# Patient Record
Sex: FEMALE | Employment: PART TIME | URBAN - METROPOLITAN AREA
[De-identification: names, ages, dates, MRNs, and addresses within clinical notes are randomized per-mention and may not be internally consistent; named-entity substitution may affect disease eponyms.]

---

## 2019-01-01 ENCOUNTER — APPOINTMENT (OUTPATIENT)
Dept: CT IMAGING | Age: 59
DRG: 720 | End: 2019-01-01
Attending: INTERNAL MEDICINE
Payer: MEDICAID

## 2019-01-01 ENCOUNTER — APPOINTMENT (OUTPATIENT)
Dept: GENERAL RADIOLOGY | Age: 59
DRG: 720 | End: 2019-01-01
Attending: EMERGENCY MEDICINE
Payer: MEDICAID

## 2019-01-01 ENCOUNTER — APPOINTMENT (OUTPATIENT)
Dept: CT IMAGING | Age: 59
DRG: 720 | End: 2019-01-01
Attending: PHYSICIAN ASSISTANT
Payer: MEDICAID

## 2019-01-01 ENCOUNTER — APPOINTMENT (OUTPATIENT)
Dept: CT IMAGING | Age: 59
DRG: 720 | End: 2019-01-01
Attending: EMERGENCY MEDICINE
Payer: MEDICAID

## 2019-01-01 ENCOUNTER — APPOINTMENT (OUTPATIENT)
Dept: NON INVASIVE DIAGNOSTICS | Age: 59
DRG: 720 | End: 2019-01-01
Attending: HOSPITALIST
Payer: MEDICAID

## 2019-01-01 ENCOUNTER — APPOINTMENT (OUTPATIENT)
Dept: GENERAL RADIOLOGY | Age: 59
DRG: 720 | End: 2019-01-01
Attending: INTERNAL MEDICINE
Payer: MEDICAID

## 2019-01-01 ENCOUNTER — APPOINTMENT (OUTPATIENT)
Dept: GENERAL RADIOLOGY | Age: 59
DRG: 720 | End: 2019-01-01
Attending: STUDENT IN AN ORGANIZED HEALTH CARE EDUCATION/TRAINING PROGRAM
Payer: MEDICAID

## 2019-01-01 ENCOUNTER — HOSPITAL ENCOUNTER (INPATIENT)
Age: 59
LOS: 3 days | DRG: 720 | End: 2019-12-10
Attending: EMERGENCY MEDICINE | Admitting: HOSPITALIST
Payer: MEDICAID

## 2019-01-01 ENCOUNTER — APPOINTMENT (OUTPATIENT)
Dept: GENERAL RADIOLOGY | Age: 59
DRG: 720 | End: 2019-01-01
Attending: HOSPITALIST
Payer: MEDICAID

## 2019-01-01 VITALS
HEIGHT: 66 IN | HEART RATE: 39 BPM | WEIGHT: 195.55 LBS | BODY MASS INDEX: 31.43 KG/M2 | OXYGEN SATURATION: 98 % | DIASTOLIC BLOOD PRESSURE: 53 MMHG | TEMPERATURE: 99.1 F | SYSTOLIC BLOOD PRESSURE: 72 MMHG

## 2019-01-01 DIAGNOSIS — R79.89 ELEVATED LACTIC ACID LEVEL: ICD-10-CM

## 2019-01-01 DIAGNOSIS — R77.8 ELEVATED TROPONIN: ICD-10-CM

## 2019-01-01 DIAGNOSIS — R41.82 ALTERED MENTAL STATUS, UNSPECIFIED ALTERED MENTAL STATUS TYPE: Primary | ICD-10-CM

## 2019-01-01 LAB
ABO + RH BLD: NORMAL
ALBUMIN SERPL-MCNC: 2.3 G/DL (ref 3.4–5)
ALBUMIN SERPL-MCNC: 2.4 G/DL (ref 3.4–5)
ALBUMIN/GLOB SERPL: 0.4 {RATIO} (ref 0.8–1.7)
ALBUMIN/GLOB SERPL: 0.6 {RATIO} (ref 0.8–1.7)
ALP SERPL-CCNC: 170 U/L (ref 45–117)
ALP SERPL-CCNC: 182 U/L (ref 45–117)
ALT SERPL-CCNC: 34 U/L (ref 13–56)
ALT SERPL-CCNC: 454 U/L (ref 13–56)
AMMONIA PLAS-SCNC: 29 UMOL/L (ref 11–32)
AMMONIA PLAS-SCNC: 64 UMOL/L (ref 11–32)
AMPHET UR QL SCN: NEGATIVE
ANION GAP SERPL CALC-SCNC: 10 MMOL/L (ref 3–18)
ANION GAP SERPL CALC-SCNC: 10 MMOL/L (ref 3–18)
ANION GAP SERPL CALC-SCNC: 11 MMOL/L (ref 3–18)
ANION GAP SERPL CALC-SCNC: 12 MMOL/L (ref 3–18)
ANION GAP SERPL CALC-SCNC: 12 MMOL/L (ref 3–18)
APPEARANCE UR: ABNORMAL
APPEARANCE UR: ABNORMAL
APTT PPP: 31.3 SEC (ref 23–36.4)
APTT PPP: 32.1 SEC (ref 23–36.4)
APTT PPP: 33.1 SEC (ref 23–36.4)
APTT PPP: 40.4 SEC (ref 23–36.4)
APTT PPP: >180 SEC (ref 23–36.4)
ARTERIAL PATENCY WRIST A: ABNORMAL
ARTERIAL PATENCY WRIST A: YES
AST SERPL-CCNC: 32 U/L (ref 10–38)
AST SERPL-CCNC: 735 U/L (ref 10–38)
ATRIAL RATE: 108 BPM
ATRIAL RATE: 150 BPM
ATRIAL RATE: 86 BPM
ATRIAL RATE: 94 BPM
BACTERIA SPEC CULT: NORMAL
BACTERIA URNS QL MICRO: ABNORMAL /HPF
BARBITURATES UR QL SCN: NEGATIVE
BASE DEFICIT BLD-SCNC: 10 MMOL/L
BASE DEFICIT BLD-SCNC: 11 MMOL/L
BASE DEFICIT BLD-SCNC: 14 MMOL/L
BASE DEFICIT BLD-SCNC: 5 MMOL/L
BASE DEFICIT BLD-SCNC: 7 MMOL/L
BASE DEFICIT BLD-SCNC: 7 MMOL/L
BASE DEFICIT BLD-SCNC: 8 MMOL/L
BASE DEFICIT BLD-SCNC: 8 MMOL/L
BASE DEFICIT BLD-SCNC: 9 MMOL/L
BASOPHILS # BLD: 0 K/UL (ref 0–0.06)
BASOPHILS NFR BLD: 0 % (ref 0–3)
BDY SITE: ABNORMAL
BENZODIAZ UR QL: NEGATIVE
BILIRUB DIRECT SERPL-MCNC: 0.7 MG/DL (ref 0–0.2)
BILIRUB SERPL-MCNC: 0.6 MG/DL (ref 0.2–1)
BILIRUB SERPL-MCNC: 0.9 MG/DL (ref 0.2–1)
BILIRUB UR QL: ABNORMAL
BILIRUB UR QL: ABNORMAL
BLOOD GROUP ANTIBODIES SERPL: NORMAL
BODY TEMPERATURE: 98.6
BRONCH. LAVAGE DIFF.,BR: NORMAL
BUN SERPL-MCNC: 31 MG/DL (ref 7–18)
BUN SERPL-MCNC: 32 MG/DL (ref 7–18)
BUN SERPL-MCNC: 32 MG/DL (ref 7–18)
BUN SERPL-MCNC: 34 MG/DL (ref 7–18)
BUN SERPL-MCNC: 49 MG/DL (ref 7–18)
BUN/CREAT SERPL: 19 (ref 12–20)
BUN/CREAT SERPL: 24 (ref 12–20)
BUN/CREAT SERPL: 25 (ref 12–20)
BUN/CREAT SERPL: 26 (ref 12–20)
BUN/CREAT SERPL: 27 (ref 12–20)
CA-I SERPL-SCNC: 1.08 MMOL/L (ref 1.12–1.32)
CALCIUM SERPL-MCNC: 7.8 MG/DL (ref 8.5–10.1)
CALCIUM SERPL-MCNC: 7.9 MG/DL (ref 8.5–10.1)
CALCIUM SERPL-MCNC: 8.1 MG/DL (ref 8.5–10.1)
CALCIUM SERPL-MCNC: 8.2 MG/DL (ref 8.5–10.1)
CALCIUM SERPL-MCNC: 8.7 MG/DL (ref 8.5–10.1)
CALCULATED P AXIS, ECG09: 46 DEGREES
CALCULATED P AXIS, ECG09: 57 DEGREES
CALCULATED R AXIS, ECG10: -31 DEGREES
CALCULATED R AXIS, ECG10: -36 DEGREES
CALCULATED R AXIS, ECG10: -39 DEGREES
CALCULATED R AXIS, ECG10: -44 DEGREES
CALCULATED T AXIS, ECG11: 13 DEGREES
CALCULATED T AXIS, ECG11: 22 DEGREES
CALCULATED T AXIS, ECG11: 33 DEGREES
CALCULATED T AXIS, ECG11: 50 DEGREES
CANNABINOIDS UR QL SCN: NEGATIVE
CHLORIDE SERPL-SCNC: 108 MMOL/L (ref 100–111)
CHLORIDE SERPL-SCNC: 111 MMOL/L (ref 100–111)
CHLORIDE SERPL-SCNC: 111 MMOL/L (ref 100–111)
CHLORIDE SERPL-SCNC: 112 MMOL/L (ref 100–111)
CHLORIDE SERPL-SCNC: 99 MMOL/L (ref 100–111)
CK MB CFR SERPL CALC: 3.4 % (ref 0–4)
CK MB CFR SERPL CALC: 4.9 % (ref 0–4)
CK MB CFR SERPL CALC: 5.8 % (ref 0–4)
CK MB CFR SERPL CALC: 7.2 % (ref 0–4)
CK MB CFR SERPL CALC: 7.8 % (ref 0–4)
CK MB CFR SERPL CALC: 9.2 % (ref 0–4)
CK MB SERPL-MCNC: 16.5 NG/ML (ref 5–25)
CK MB SERPL-MCNC: 16.8 NG/ML (ref 5–25)
CK MB SERPL-MCNC: 2.6 NG/ML (ref 5–25)
CK MB SERPL-MCNC: 3.4 NG/ML (ref 5–25)
CK MB SERPL-MCNC: 3.5 NG/ML (ref 5–25)
CK MB SERPL-MCNC: 4.5 NG/ML (ref 5–25)
CK SERPL-CCNC: 131 U/L (ref 26–192)
CK SERPL-CCNC: 179 U/L (ref 26–192)
CK SERPL-CCNC: 216 U/L (ref 26–192)
CK SERPL-CCNC: 36 U/L (ref 26–192)
CK SERPL-CCNC: 59 U/L (ref 26–192)
CK SERPL-CCNC: 72 U/L (ref 26–192)
CO2 SERPL-SCNC: 17 MMOL/L (ref 21–32)
CO2 SERPL-SCNC: 19 MMOL/L (ref 21–32)
CO2 SERPL-SCNC: 21 MMOL/L (ref 21–32)
COCAINE UR QL SCN: NEGATIVE
COLOR UR: ABNORMAL
COLOR UR: ABNORMAL
CREAT SERPL-MCNC: 1.17 MG/DL (ref 0.6–1.3)
CREAT SERPL-MCNC: 1.21 MG/DL (ref 0.6–1.3)
CREAT SERPL-MCNC: 1.25 MG/DL (ref 0.6–1.3)
CREAT SERPL-MCNC: 1.8 MG/DL (ref 0.6–1.3)
CREAT SERPL-MCNC: 2.05 MG/DL (ref 0.6–1.3)
D DIMER PPP FEU-MCNC: >20 UG/ML(FEU)
DIAGNOSIS, 93000: NORMAL
DIFFERENTIAL METHOD BLD: ABNORMAL
ECHO LV EDV A2C: 39.3 ML
ECHO LV EDV A4C: 47.4 ML
ECHO LV EDV BP: 49.1 ML (ref 56–104)
ECHO LV EDV INDEX A4C: 24 ML/M2
ECHO LV EDV INDEX BP: 24.8 ML/M2
ECHO LV EDV NDEX A2C: 19.9 ML/M2
ECHO LV EDV TEICHHOLZ: 0.17 ML
ECHO LV EJECTION FRACTION A2C: 49 %
ECHO LV EJECTION FRACTION A4C: 41 %
ECHO LV EJECTION FRACTION BIPLANE: 48.1 % (ref 55–100)
ECHO LV ESV A2C: 20.2 ML
ECHO LV ESV A4C: 28.1 ML
ECHO LV ESV BP: 25.5 ML (ref 19–49)
ECHO LV ESV INDEX A2C: 10.2 ML/M2
ECHO LV ESV INDEX A4C: 14.2 ML/M2
ECHO LV ESV INDEX BP: 12.9 ML/M2
ECHO LV ESV TEICHHOLZ: 0.12 ML
ECHO LV INTERNAL DIMENSION DIASTOLIC: 2.74 CM (ref 3.9–5.3)
ECHO LV INTERNAL DIMENSION SYSTOLIC: 2.38 CM
ECHO LV IVSD: 1.17 CM (ref 0.6–0.9)
ECHO LV MASS 2D: 104.2 G (ref 67–162)
ECHO LV MASS INDEX 2D: 52.7 G/M2 (ref 43–95)
ECHO LV POSTERIOR WALL DIASTOLIC: 1.2 CM (ref 0.6–0.9)
EOSINOPHIL # BLD: 0 K/UL (ref 0–0.4)
EOSINOPHIL NFR BLD: 0 % (ref 0–5)
EOSINOPHIL NFR BRONCH MANUAL: 0 %
EPITH CASTS URNS QL MICRO: ABNORMAL /LPF (ref 0–5)
ERYTHROCYTE [DISTWIDTH] IN BLOOD BY AUTOMATED COUNT: 13.8 % (ref 11.6–14.5)
ERYTHROCYTE [DISTWIDTH] IN BLOOD BY AUTOMATED COUNT: 13.9 % (ref 11.6–14.5)
ERYTHROCYTE [DISTWIDTH] IN BLOOD BY AUTOMATED COUNT: 14.4 % (ref 11.6–14.5)
ERYTHROCYTE [DISTWIDTH] IN BLOOD BY AUTOMATED COUNT: 14.5 % (ref 11.6–14.5)
ERYTHROCYTE [DISTWIDTH] IN BLOOD BY AUTOMATED COUNT: 15.1 % (ref 11.6–14.5)
EST. AVERAGE GLUCOSE BLD GHB EST-MCNC: 169 MG/DL
ETHANOL SERPL-MCNC: <3 MG/DL (ref 0–3)
FIBRINOGEN PPP-MCNC: 188 MG/DL (ref 210–451)
FLUAV AG NPH QL IA: NEGATIVE
FLUBV AG NOSE QL IA: NEGATIVE
GAS FLOW.O2 O2 DELIVERY SYS: ABNORMAL L/MIN
GAS FLOW.O2 SETTING OXYMISER: 10 BPM
GAS FLOW.O2 SETTING OXYMISER: 18 BPM
GAS FLOW.O2 SETTING OXYMISER: 20 BPM
GAS FLOW.O2 SETTING OXYMISER: 20 BPM
GAS FLOW.O2 SETTING OXYMISER: 40 L/M
GAS FLOW.O2 SETTING OXYMISER: 55 L/M
GAS FLOW.O2 SETTING OXYMISER: 55 L/M
GLOBULIN SER CALC-MCNC: 3.9 G/DL (ref 2–4)
GLOBULIN SER CALC-MCNC: 5.6 G/DL (ref 2–4)
GLUCOSE BLD STRIP.AUTO-MCNC: 116 MG/DL (ref 70–110)
GLUCOSE BLD STRIP.AUTO-MCNC: 128 MG/DL (ref 70–110)
GLUCOSE BLD STRIP.AUTO-MCNC: 133 MG/DL (ref 70–110)
GLUCOSE BLD STRIP.AUTO-MCNC: 144 MG/DL (ref 70–110)
GLUCOSE BLD STRIP.AUTO-MCNC: 147 MG/DL (ref 70–110)
GLUCOSE BLD STRIP.AUTO-MCNC: 148 MG/DL (ref 70–110)
GLUCOSE BLD STRIP.AUTO-MCNC: 158 MG/DL (ref 70–110)
GLUCOSE BLD STRIP.AUTO-MCNC: 165 MG/DL (ref 70–110)
GLUCOSE BLD STRIP.AUTO-MCNC: 166 MG/DL (ref 70–110)
GLUCOSE BLD STRIP.AUTO-MCNC: 178 MG/DL (ref 70–110)
GLUCOSE SERPL-MCNC: 151 MG/DL (ref 74–99)
GLUCOSE SERPL-MCNC: 184 MG/DL (ref 74–99)
GLUCOSE SERPL-MCNC: 189 MG/DL (ref 74–99)
GLUCOSE SERPL-MCNC: 237 MG/DL (ref 74–99)
GLUCOSE SERPL-MCNC: 265 MG/DL (ref 74–99)
GLUCOSE UR STRIP.AUTO-MCNC: NEGATIVE MG/DL
GLUCOSE UR STRIP.AUTO-MCNC: NEGATIVE MG/DL
HBA1C MFR BLD: 7.5 % (ref 4.2–5.6)
HCO3 BLD-SCNC: 11.9 MMOL/L (ref 22–26)
HCO3 BLD-SCNC: 14.2 MMOL/L (ref 22–26)
HCO3 BLD-SCNC: 16.1 MMOL/L (ref 22–26)
HCO3 BLD-SCNC: 16.7 MMOL/L (ref 22–26)
HCO3 BLD-SCNC: 17.6 MMOL/L (ref 22–26)
HCO3 BLD-SCNC: 18 MMOL/L (ref 22–26)
HCO3 BLD-SCNC: 18.1 MMOL/L (ref 22–26)
HCO3 BLD-SCNC: 18.1 MMOL/L (ref 22–26)
HCO3 BLD-SCNC: 22 MMOL/L (ref 22–26)
HCT VFR BLD AUTO: 32.3 % (ref 35–45)
HCT VFR BLD AUTO: 32.9 % (ref 35–45)
HCT VFR BLD AUTO: 33.5 % (ref 35–45)
HCT VFR BLD AUTO: 34.5 % (ref 35–45)
HCT VFR BLD AUTO: 36.6 % (ref 35–45)
HDSCOM,HDSCOM: NORMAL
HGB BLD-MCNC: 10 G/DL (ref 12–16)
HGB BLD-MCNC: 10.9 G/DL (ref 12–16)
HGB BLD-MCNC: 11 G/DL (ref 12–16)
HGB BLD-MCNC: 11.3 G/DL (ref 12–16)
HGB BLD-MCNC: 12.4 G/DL (ref 12–16)
HGB UR QL STRIP: NEGATIVE
HGB UR QL STRIP: NEGATIVE
INR PPP: 4.3 (ref 0.8–1.2)
INR PPP: 4.4 (ref 0.8–1.2)
INR PPP: 4.4 (ref 0.8–1.2)
INR PPP: 7.3 (ref 0.8–1.2)
INR PPP: >9.2 (ref 0.8–1.2)
INSPIRATION.DURATION SETTING TIME VENT: 0.9 SEC
KETONES UR QL STRIP.AUTO: NEGATIVE MG/DL
KETONES UR QL STRIP.AUTO: NEGATIVE MG/DL
LACTATE BLD-SCNC: 2.94 MMOL/L (ref 0.4–2)
LACTATE BLD-SCNC: 4.71 MMOL/L (ref 0.4–2)
LACTATE BLD-SCNC: 4.72 MMOL/L (ref 0.4–2)
LACTATE BLD-SCNC: 5.99 MMOL/L (ref 0.4–2)
LACTATE SERPL-SCNC: 3 MMOL/L (ref 0.4–2)
LACTATE SERPL-SCNC: 3.1 MMOL/L (ref 0.4–2)
LACTATE SERPL-SCNC: 5.4 MMOL/L (ref 0.4–2)
LACTATE SERPL-SCNC: 6.1 MMOL/L (ref 0.4–2)
LACTATE SERPL-SCNC: 6.6 MMOL/L (ref 0.4–2)
LDH SERPL L TO P-CCNC: 1209 U/L (ref 81–234)
LDH SERPL L TO P-CCNC: 624 U/L (ref 81–234)
LEUKOCYTE ESTERASE UR QL STRIP.AUTO: NEGATIVE
LEUKOCYTE ESTERASE UR QL STRIP.AUTO: NEGATIVE
LVFS 2D: 13.33 %
LVSV (MOD BI): 11.63 ML
LVSV (MOD SINGLE 4C): 9.49 ML
LVSV (MOD SINGLE): 9.43 ML
LVSV (TEICH): 4.14 ML
LYMPHOCYTES # BLD: 0.8 K/UL (ref 0.8–3.5)
LYMPHOCYTES # BLD: 0.8 K/UL (ref 0.8–3.5)
LYMPHOCYTES # BLD: 0.9 K/UL (ref 0.8–3.5)
LYMPHOCYTES # BLD: 0.9 K/UL (ref 0.8–3.5)
LYMPHOCYTES # BLD: 1.4 K/UL (ref 0.8–3.5)
LYMPHOCYTES NFR BLD: 4 % (ref 20–51)
LYMPHOCYTES NFR BLD: 5 % (ref 20–51)
LYMPHOCYTES NFR BLD: 7 % (ref 20–51)
LYMPHOCYTES NFR BRONCH MANUAL: 9 %
MACROPHAGES NFR BRONCH MANUAL: 55 %
MAGNESIUM SERPL-MCNC: 2.5 MG/DL (ref 1.6–2.6)
MAGNESIUM SERPL-MCNC: 2.6 MG/DL (ref 1.6–2.6)
MAGNESIUM SERPL-MCNC: 2.9 MG/DL (ref 1.6–2.6)
MCH RBC QN AUTO: 28.1 PG (ref 24–34)
MCH RBC QN AUTO: 28.8 PG (ref 24–34)
MCH RBC QN AUTO: 29 PG (ref 24–34)
MCH RBC QN AUTO: 29.2 PG (ref 24–34)
MCH RBC QN AUTO: 29.5 PG (ref 24–34)
MCHC RBC AUTO-ENTMCNC: 31 G/DL (ref 31–37)
MCHC RBC AUTO-ENTMCNC: 32.5 G/DL (ref 31–37)
MCHC RBC AUTO-ENTMCNC: 32.8 G/DL (ref 31–37)
MCHC RBC AUTO-ENTMCNC: 33.4 G/DL (ref 31–37)
MCHC RBC AUTO-ENTMCNC: 33.9 G/DL (ref 31–37)
MCV RBC AUTO: 87.1 FL (ref 74–97)
MCV RBC AUTO: 87.3 FL (ref 74–97)
MCV RBC AUTO: 88.6 FL (ref 74–97)
MCV RBC AUTO: 88.7 FL (ref 74–97)
MCV RBC AUTO: 90.7 FL (ref 74–97)
METAMYELOCYTES NFR BLD MANUAL: 1 %
METHADONE UR QL: NEGATIVE
MONOCYTES # BLD: 0.4 K/UL (ref 0–1)
MONOCYTES # BLD: 0.7 K/UL (ref 0–1)
MONOCYTES # BLD: 1 K/UL (ref 0–1)
MONOCYTES # BLD: 1 K/UL (ref 0–1)
MONOCYTES # BLD: 1.7 K/UL (ref 0–1)
MONOCYTES NFR BLD: 2 % (ref 2–9)
MONOCYTES NFR BLD: 3 % (ref 2–9)
MONOCYTES NFR BLD: 5 % (ref 2–9)
MONOCYTES NFR BLD: 5 % (ref 2–9)
MONOCYTES NFR BLD: 9 % (ref 2–9)
NEUTROPHILS NFR BRONCH MANUAL: 36 %
NEUTS BAND NFR BLD MANUAL: 2 % (ref 0–5)
NEUTS BAND NFR BLD MANUAL: 4 % (ref 0–5)
NEUTS BAND NFR BLD MANUAL: 5 % (ref 0–5)
NEUTS SEG # BLD: 16.1 K/UL (ref 1.8–8)
NEUTS SEG # BLD: 17.5 K/UL (ref 1.8–8)
NEUTS SEG # BLD: 17.8 K/UL (ref 1.8–8)
NEUTS SEG # BLD: 19.1 K/UL (ref 1.8–8)
NEUTS SEG # BLD: 20.7 K/UL (ref 1.8–8)
NEUTS SEG NFR BLD: 86 % (ref 42–75)
NEUTS SEG NFR BLD: 86 % (ref 42–75)
NEUTS SEG NFR BLD: 88 % (ref 42–75)
NEUTS SEG NFR BLD: 89 % (ref 42–75)
NEUTS SEG NFR BLD: 91 % (ref 42–75)
NITRITE UR QL STRIP.AUTO: NEGATIVE
NITRITE UR QL STRIP.AUTO: NEGATIVE
NRBC BLD-RTO: 8 PER 100 WBC
O2/TOTAL GAS SETTING VFR VENT: 0.5 %
O2/TOTAL GAS SETTING VFR VENT: 100 %
O2/TOTAL GAS SETTING VFR VENT: 100 %
O2/TOTAL GAS SETTING VFR VENT: 50 %
O2/TOTAL GAS SETTING VFR VENT: 50 %
O2/TOTAL GAS SETTING VFR VENT: 60 %
OPIATES UR QL: NEGATIVE
P-R INTERVAL, ECG05: 146 MS
P-R INTERVAL, ECG05: 154 MS
PCO2 BLD: 24.4 MMHG (ref 35–45)
PCO2 BLD: 26.3 MMHG (ref 35–45)
PCO2 BLD: 26.7 MMHG (ref 35–45)
PCO2 BLD: 27.4 MMHG (ref 35–45)
PCO2 BLD: 28.4 MMHG (ref 35–45)
PCO2 BLD: 31.5 MMHG (ref 35–45)
PCO2 BLD: 31.8 MMHG (ref 35–45)
PCO2 BLD: 44.3 MMHG (ref 35–45)
PCO2 BLD: 71 MMHG (ref 35–45)
PCP UR QL: NEGATIVE
PEEP RESPIRATORY: 5 CMH2O
PH BLD: 7.1 [PH] (ref 7.35–7.45)
PH BLD: 7.22 [PH] (ref 7.35–7.45)
PH BLD: 7.26 [PH] (ref 7.35–7.45)
PH BLD: 7.33 [PH] (ref 7.35–7.45)
PH BLD: 7.36 [PH] (ref 7.35–7.45)
PH BLD: 7.37 [PH] (ref 7.35–7.45)
PH BLD: 7.38 [PH] (ref 7.35–7.45)
PH BLD: 7.41 [PH] (ref 7.35–7.45)
PH BLD: 7.43 [PH] (ref 7.35–7.45)
PH UR STRIP: 5 [PH] (ref 5–8)
PH UR STRIP: 5 [PH] (ref 5–8)
PHOSPHATE SERPL-MCNC: 3.9 MG/DL (ref 2.5–4.9)
PIP ISTAT,IPIP: 14
PIP ISTAT,IPIP: 18
PIP ISTAT,IPIP: 31
PIP ISTAT,IPIP: 34
PLATELET # BLD AUTO: 129 K/UL (ref 135–420)
PLATELET # BLD AUTO: 237 K/UL (ref 135–420)
PLATELET # BLD AUTO: 280 K/UL (ref 135–420)
PLATELET # BLD AUTO: 281 K/UL (ref 135–420)
PLATELET # BLD AUTO: 326 K/UL (ref 135–420)
PLATELET COMMENTS,PCOM: ABNORMAL
PMV BLD AUTO: 10 FL (ref 9.2–11.8)
PMV BLD AUTO: 10 FL (ref 9.2–11.8)
PMV BLD AUTO: 10.6 FL (ref 9.2–11.8)
PMV BLD AUTO: 9.7 FL (ref 9.2–11.8)
PMV BLD AUTO: 9.8 FL (ref 9.2–11.8)
PO2 BLD: 106 MMHG (ref 80–100)
PO2 BLD: 110 MMHG (ref 80–100)
PO2 BLD: 125 MMHG (ref 80–100)
PO2 BLD: 144 MMHG (ref 80–100)
PO2 BLD: 271 MMHG (ref 80–100)
PO2 BLD: 34 MMHG (ref 80–100)
PO2 BLD: 73 MMHG (ref 80–100)
PO2 BLD: 74 MMHG (ref 80–100)
PO2 BLD: 86 MMHG (ref 80–100)
POTASSIUM SERPL-SCNC: 4.3 MMOL/L (ref 3.5–5.5)
POTASSIUM SERPL-SCNC: 4.4 MMOL/L (ref 3.5–5.5)
POTASSIUM SERPL-SCNC: 4.6 MMOL/L (ref 3.5–5.5)
POTASSIUM SERPL-SCNC: 4.7 MMOL/L (ref 3.5–5.5)
POTASSIUM SERPL-SCNC: 4.8 MMOL/L (ref 3.5–5.5)
PROT SERPL-MCNC: 6.3 G/DL (ref 6.4–8.2)
PROT SERPL-MCNC: 7.9 G/DL (ref 6.4–8.2)
PROT UR STRIP-MCNC: 30 MG/DL
PROT UR STRIP-MCNC: NEGATIVE MG/DL
PROTHROMBIN TIME: 40.7 SEC (ref 11.5–15.2)
PROTHROMBIN TIME: 41.8 SEC (ref 11.5–15.2)
PROTHROMBIN TIME: 41.9 SEC (ref 11.5–15.2)
PROTHROMBIN TIME: 62.4 SEC (ref 11.5–15.2)
PROTHROMBIN TIME: >75 SEC (ref 11.5–15.2)
Q-T INTERVAL, ECG07: 254 MS
Q-T INTERVAL, ECG07: 260 MS
Q-T INTERVAL, ECG07: 346 MS
Q-T INTERVAL, ECG07: 368 MS
QRS DURATION, ECG06: 68 MS
QRS DURATION, ECG06: 70 MS
QRS DURATION, ECG06: 84 MS
QRS DURATION, ECG06: 88 MS
QTC CALCULATION (BEZET), ECG08: 366 MS
QTC CALCULATION (BEZET), ECG08: 396 MS
QTC CALCULATION (BEZET), ECG08: 440 MS
QTC CALCULATION (BEZET), ECG08: 463 MS
RBC # BLD AUTO: 3.56 M/UL (ref 4.2–5.3)
RBC # BLD AUTO: 3.77 M/UL (ref 4.2–5.3)
RBC # BLD AUTO: 3.78 M/UL (ref 4.2–5.3)
RBC # BLD AUTO: 3.89 M/UL (ref 4.2–5.3)
RBC # BLD AUTO: 4.2 M/UL (ref 4.2–5.3)
RBC #/AREA URNS HPF: ABNORMAL /HPF (ref 0–5)
RBC MORPH BLD: ABNORMAL
SAO2 % BLD: 100 % (ref 92–97)
SAO2 % BLD: 63 % (ref 92–97)
SAO2 % BLD: 94 % (ref 92–97)
SAO2 % BLD: 95 % (ref 92–97)
SAO2 % BLD: 95 % (ref 92–97)
SAO2 % BLD: 97 % (ref 92–97)
SAO2 % BLD: 98 % (ref 92–97)
SAO2 % BLD: 98 % (ref 92–97)
SAO2 % BLD: 99 % (ref 92–97)
SERVICE CMNT-IMP: ABNORMAL
SERVICE CMNT-IMP: NORMAL
SODIUM SERPL-SCNC: 132 MMOL/L (ref 136–145)
SODIUM SERPL-SCNC: 137 MMOL/L (ref 136–145)
SODIUM SERPL-SCNC: 140 MMOL/L (ref 136–145)
SODIUM SERPL-SCNC: 141 MMOL/L (ref 136–145)
SODIUM SERPL-SCNC: 141 MMOL/L (ref 136–145)
SP GR UR REFRACTOMETRY: 1.02 (ref 1–1.03)
SP GR UR REFRACTOMETRY: 1.03 (ref 1–1.03)
SPECIMEN EXP DATE BLD: NORMAL
SPECIMEN TYPE: ABNORMAL
SPONTANEOUS TIMED, IST: YES
TOTAL RESP. RATE, ITRR: 10
TOTAL RESP. RATE, ITRR: 19
TOTAL RESP. RATE, ITRR: 28
TOTAL RESP. RATE, ITRR: 30
TOTAL RESP. RATE, ITRR: 30
TOTAL RESP. RATE, ITRR: 31
TOTAL RESP. RATE, ITRR: 36
TOTAL RESP. RATE, ITRR: 36
TOTAL RESP. RATE, ITRR: 40
TROPONIN I SERPL-MCNC: 0.29 NG/ML (ref 0–0.04)
TROPONIN I SERPL-MCNC: 0.47 NG/ML (ref 0–0.04)
TROPONIN I SERPL-MCNC: 0.56 NG/ML (ref 0–0.04)
TROPONIN I SERPL-MCNC: 0.71 NG/ML (ref 0–0.04)
TROPONIN I SERPL-MCNC: 0.71 NG/ML (ref 0–0.04)
TROPONIN I SERPL-MCNC: 4.6 NG/ML (ref 0–0.04)
TROPONIN I SERPL-MCNC: 7.24 NG/ML (ref 0–0.04)
URATE CRY URNS QL MICRO: ABNORMAL
UROBILINOGEN UR QL STRIP.AUTO: 2 EU/DL (ref 0.2–1)
UROBILINOGEN UR QL STRIP.AUTO: 4 EU/DL (ref 0.2–1)
VENTILATION MODE VENT: ABNORMAL
VENTRICULAR RATE, ECG03: 108 BPM
VENTRICULAR RATE, ECG03: 125 BPM
VENTRICULAR RATE, ECG03: 140 BPM
VENTRICULAR RATE, ECG03: 86 BPM
VOLUME CONTROL PLUS IVLCP: YES
VT SETTING VENT: 400 ML
WBC # BLD AUTO: 18.7 K/UL (ref 4.6–13.2)
WBC # BLD AUTO: 19.6 K/UL (ref 4.6–13.2)
WBC # BLD AUTO: 19.9 K/UL (ref 4.6–13.2)
WBC # BLD AUTO: 20.5 K/UL (ref 4.6–13.2)
WBC # BLD AUTO: 22.3 K/UL (ref 4.6–13.2)
WBC URNS QL MICRO: ABNORMAL /HPF (ref 0–5)

## 2019-01-01 PROCEDURE — 71045 X-RAY EXAM CHEST 1 VIEW: CPT

## 2019-01-01 PROCEDURE — 74011250636 HC RX REV CODE- 250/636: Performed by: NURSE PRACTITIONER

## 2019-01-01 PROCEDURE — 36600 WITHDRAWAL OF ARTERIAL BLOOD: CPT

## 2019-01-01 PROCEDURE — 84484 ASSAY OF TROPONIN QUANT: CPT

## 2019-01-01 PROCEDURE — 5A1935Z RESPIRATORY VENTILATION, LESS THAN 24 CONSECUTIVE HOURS: ICD-10-PCS | Performed by: INTERNAL MEDICINE

## 2019-01-01 PROCEDURE — 74011000258 HC RX REV CODE- 258: Performed by: HOSPITALIST

## 2019-01-01 PROCEDURE — 74011000250 HC RX REV CODE- 250: Performed by: PHYSICIAN ASSISTANT

## 2019-01-01 PROCEDURE — 74011250636 HC RX REV CODE- 250/636: Performed by: EMERGENCY MEDICINE

## 2019-01-01 PROCEDURE — 81001 URINALYSIS AUTO W/SCOPE: CPT

## 2019-01-01 PROCEDURE — 86900 BLOOD TYPING SEROLOGIC ABO: CPT

## 2019-01-01 PROCEDURE — 85025 COMPLETE CBC W/AUTO DIFF WBC: CPT

## 2019-01-01 PROCEDURE — 85379 FIBRIN DEGRADATION QUANT: CPT

## 2019-01-01 PROCEDURE — 80076 HEPATIC FUNCTION PANEL: CPT

## 2019-01-01 PROCEDURE — 83010 ASSAY OF HAPTOGLOBIN QUANT: CPT

## 2019-01-01 PROCEDURE — 82803 BLOOD GASES ANY COMBINATION: CPT

## 2019-01-01 PROCEDURE — 71250 CT THORAX DX C-: CPT

## 2019-01-01 PROCEDURE — 74011000250 HC RX REV CODE- 250: Performed by: INTERNAL MEDICINE

## 2019-01-01 PROCEDURE — 82962 GLUCOSE BLOOD TEST: CPT

## 2019-01-01 PROCEDURE — 80053 COMPREHEN METABOLIC PANEL: CPT

## 2019-01-01 PROCEDURE — 85730 THROMBOPLASTIN TIME PARTIAL: CPT

## 2019-01-01 PROCEDURE — 83605 ASSAY OF LACTIC ACID: CPT

## 2019-01-01 PROCEDURE — 74011250636 HC RX REV CODE- 250/636: Performed by: HOSPITALIST

## 2019-01-01 PROCEDURE — 74011636637 HC RX REV CODE- 636/637: Performed by: HOSPITALIST

## 2019-01-01 PROCEDURE — 36415 COLL VENOUS BLD VENIPUNCTURE: CPT

## 2019-01-01 PROCEDURE — 74011250636 HC RX REV CODE- 250/636: Performed by: PHYSICIAN ASSISTANT

## 2019-01-01 PROCEDURE — 82550 ASSAY OF CK (CPK): CPT

## 2019-01-01 PROCEDURE — 82140 ASSAY OF AMMONIA: CPT

## 2019-01-01 PROCEDURE — 96361 HYDRATE IV INFUSION ADD-ON: CPT

## 2019-01-01 PROCEDURE — 80048 BASIC METABOLIC PNL TOTAL CA: CPT

## 2019-01-01 PROCEDURE — 93005 ELECTROCARDIOGRAM TRACING: CPT

## 2019-01-01 PROCEDURE — 87070 CULTURE OTHR SPECIMN AEROBIC: CPT

## 2019-01-01 PROCEDURE — 76450000000

## 2019-01-01 PROCEDURE — 94003 VENT MGMT INPAT SUBQ DAY: CPT

## 2019-01-01 PROCEDURE — 65610000006 HC RM INTENSIVE CARE

## 2019-01-01 PROCEDURE — 65660000000 HC RM CCU STEPDOWN

## 2019-01-01 PROCEDURE — 83615 LACTATE (LD) (LDH) ENZYME: CPT

## 2019-01-01 PROCEDURE — 94002 VENT MGMT INPAT INIT DAY: CPT

## 2019-01-01 PROCEDURE — 93308 TTE F-UP OR LMTD: CPT

## 2019-01-01 PROCEDURE — 89051 BODY FLUID CELL COUNT: CPT

## 2019-01-01 PROCEDURE — 77030005513 HC CATH URETH FOL11 MDII -B

## 2019-01-01 PROCEDURE — 77010033711 HC HIGH FLOW OXYGEN

## 2019-01-01 PROCEDURE — 81003 URINALYSIS AUTO W/O SCOPE: CPT

## 2019-01-01 PROCEDURE — 84145 PROCALCITONIN (PCT): CPT

## 2019-01-01 PROCEDURE — 94660 CPAP INITIATION&MGMT: CPT

## 2019-01-01 PROCEDURE — 85610 PROTHROMBIN TIME: CPT

## 2019-01-01 PROCEDURE — 85384 FIBRINOGEN ACTIVITY: CPT

## 2019-01-01 PROCEDURE — 96360 HYDRATION IV INFUSION INIT: CPT

## 2019-01-01 PROCEDURE — 74011250636 HC RX REV CODE- 250/636

## 2019-01-01 PROCEDURE — 74011250636 HC RX REV CODE- 250/636: Performed by: INTERNAL MEDICINE

## 2019-01-01 PROCEDURE — 82330 ASSAY OF CALCIUM: CPT

## 2019-01-01 PROCEDURE — 99285 EMERGENCY DEPT VISIT HI MDM: CPT

## 2019-01-01 PROCEDURE — 02HV33Z INSERTION OF INFUSION DEVICE INTO SUPERIOR VENA CAVA, PERCUTANEOUS APPROACH: ICD-10-PCS | Performed by: INTERNAL MEDICINE

## 2019-01-01 PROCEDURE — 87086 URINE CULTURE/COLONY COUNT: CPT

## 2019-01-01 PROCEDURE — 74011000250 HC RX REV CODE- 250: Performed by: STUDENT IN AN ORGANIZED HEALTH CARE EDUCATION/TRAINING PROGRAM

## 2019-01-01 PROCEDURE — 0BH17EZ INSERTION OF ENDOTRACHEAL AIRWAY INTO TRACHEA, VIA NATURAL OR ARTIFICIAL OPENING: ICD-10-PCS | Performed by: INTERNAL MEDICINE

## 2019-01-01 PROCEDURE — 77030018836 HC SOL IRR NACL ICUM -A

## 2019-01-01 PROCEDURE — 84100 ASSAY OF PHOSPHORUS: CPT

## 2019-01-01 PROCEDURE — P9045 ALBUMIN (HUMAN), 5%, 250 ML: HCPCS | Performed by: PHYSICIAN ASSISTANT

## 2019-01-01 PROCEDURE — 70450 CT HEAD/BRAIN W/O DYE: CPT

## 2019-01-01 PROCEDURE — 0B9J8ZX DRAINAGE OF LEFT LOWER LUNG LOBE, VIA NATURAL OR ARTIFICIAL OPENING ENDOSCOPIC, DIAGNOSTIC: ICD-10-PCS | Performed by: INTERNAL MEDICINE

## 2019-01-01 PROCEDURE — 74018 RADEX ABDOMEN 1 VIEW: CPT

## 2019-01-01 PROCEDURE — 74011000258 HC RX REV CODE- 258: Performed by: EMERGENCY MEDICINE

## 2019-01-01 PROCEDURE — 31500 INSERT EMERGENCY AIRWAY: CPT

## 2019-01-01 PROCEDURE — 74011250636 HC RX REV CODE- 250/636: Performed by: STUDENT IN AN ORGANIZED HEALTH CARE EDUCATION/TRAINING PROGRAM

## 2019-01-01 PROCEDURE — 87804 INFLUENZA ASSAY W/OPTIC: CPT

## 2019-01-01 PROCEDURE — 80307 DRUG TEST PRSMV CHEM ANLYZR: CPT

## 2019-01-01 PROCEDURE — 83036 HEMOGLOBIN GLYCOSYLATED A1C: CPT

## 2019-01-01 PROCEDURE — 83735 ASSAY OF MAGNESIUM: CPT

## 2019-01-01 PROCEDURE — 77030008771 HC TU NG SALEM SUMP -A

## 2019-01-01 PROCEDURE — 94762 N-INVAS EAR/PLS OXIMTRY CONT: CPT

## 2019-01-01 PROCEDURE — 5A09357 ASSISTANCE WITH RESPIRATORY VENTILATION, LESS THAN 24 CONSECUTIVE HOURS, CONTINUOUS POSITIVE AIRWAY PRESSURE: ICD-10-PCS | Performed by: EMERGENCY MEDICINE

## 2019-01-01 PROCEDURE — 85611 PROTHROMBIN TEST: CPT

## 2019-01-01 PROCEDURE — 92950 HEART/LUNG RESUSCITATION CPR: CPT

## 2019-01-01 PROCEDURE — 87040 BLOOD CULTURE FOR BACTERIA: CPT

## 2019-01-01 PROCEDURE — 74011000250 HC RX REV CODE- 250

## 2019-01-01 PROCEDURE — 65660000004 HC RM CVT STEPDOWN

## 2019-01-01 RX ORDER — EPINEPHRINE 1 MG/ML
INJECTION, SOLUTION, CONCENTRATE INTRAVENOUS
Status: DISPENSED
Start: 2019-01-01 | End: 2019-01-01

## 2019-01-01 RX ORDER — SODIUM CHLORIDE FOR INHALATION 3 %
4 VIAL, NEBULIZER (ML) INHALATION
Status: DISCONTINUED | OUTPATIENT
Start: 2019-01-01 | End: 2019-01-01

## 2019-01-01 RX ORDER — AMIODARONE HCL/D5W 450 MG/250
1 PLASTIC BAG, INJECTION (ML) INTRAVENOUS
Status: DISCONTINUED | OUTPATIENT
Start: 2019-01-01 | End: 2019-01-01

## 2019-01-01 RX ORDER — INSULIN LISPRO 100 [IU]/ML
INJECTION, SOLUTION INTRAVENOUS; SUBCUTANEOUS EVERY 6 HOURS
Status: DISCONTINUED | OUTPATIENT
Start: 2019-01-01 | End: 2019-01-01

## 2019-01-01 RX ORDER — MAGNESIUM SULFATE 100 %
4 CRYSTALS MISCELLANEOUS AS NEEDED
Status: DISCONTINUED | OUTPATIENT
Start: 2019-01-01 | End: 2019-01-01

## 2019-01-01 RX ORDER — FENTANYL CITRATE 50 UG/ML
50-100 INJECTION, SOLUTION INTRAMUSCULAR; INTRAVENOUS
Status: DISCONTINUED | OUTPATIENT
Start: 2019-01-01 | End: 2019-01-01 | Stop reason: HOSPADM

## 2019-01-01 RX ORDER — KETAMINE HCL 50MG/ML(1)
SYRINGE (ML) INTRAVENOUS
Status: COMPLETED
Start: 2019-01-01 | End: 2019-01-01

## 2019-01-01 RX ORDER — HEPARIN SODIUM 10000 [USP'U]/100ML
18-36 INJECTION, SOLUTION INTRAVENOUS
Status: DISCONTINUED | OUTPATIENT
Start: 2019-01-01 | End: 2019-01-01

## 2019-01-01 RX ORDER — SODIUM CHLORIDE 0.9 % (FLUSH) 0.9 %
5-10 SYRINGE (ML) INJECTION AS NEEDED
Status: DISCONTINUED | OUTPATIENT
Start: 2019-01-01 | End: 2019-01-01

## 2019-01-01 RX ORDER — CHLORHEXIDINE GLUCONATE 1.2 MG/ML
10 RINSE ORAL EVERY 12 HOURS
Status: DISCONTINUED | OUTPATIENT
Start: 2019-01-01 | End: 2019-01-01

## 2019-01-01 RX ORDER — SODIUM CHLORIDE 9 MG/ML
50 INJECTION, SOLUTION INTRAVENOUS CONTINUOUS
Status: DISCONTINUED | OUTPATIENT
Start: 2019-01-01 | End: 2019-01-01

## 2019-01-01 RX ORDER — PHYTONADIONE 10 MG/ML
10 INJECTION, EMULSION INTRAMUSCULAR; INTRAVENOUS; SUBCUTANEOUS ONCE
Status: COMPLETED | OUTPATIENT
Start: 2019-01-01 | End: 2019-01-01

## 2019-01-01 RX ORDER — AMIODARONE HYDROCHLORIDE 200 MG/1
200 TABLET ORAL DAILY
COMMUNITY

## 2019-01-01 RX ORDER — FUROSEMIDE 10 MG/ML
20 INJECTION INTRAMUSCULAR; INTRAVENOUS ONCE
Status: COMPLETED | OUTPATIENT
Start: 2019-01-01 | End: 2019-01-01

## 2019-01-01 RX ORDER — NOREPINEPHRINE BITARTRATE/D5W 8 MG/250ML
PLASTIC BAG, INJECTION (ML) INTRAVENOUS
Status: COMPLETED
Start: 2019-01-01 | End: 2019-01-01

## 2019-01-01 RX ORDER — ONDANSETRON 2 MG/ML
4 INJECTION INTRAMUSCULAR; INTRAVENOUS
Status: DISCONTINUED | OUTPATIENT
Start: 2019-01-01 | End: 2019-01-01

## 2019-01-01 RX ORDER — MONTELUKAST SODIUM 10 MG/1
10 TABLET ORAL
Status: DISCONTINUED | OUTPATIENT
Start: 2019-01-01 | End: 2019-01-01

## 2019-01-01 RX ORDER — BENZONATATE 100 MG/1
CAPSULE ORAL
COMMUNITY
Start: 2019-01-01

## 2019-01-01 RX ORDER — MONTELUKAST SODIUM 10 MG/1
10 TABLET ORAL
COMMUNITY
Start: 2019-01-01

## 2019-01-01 RX ORDER — NOREPINEPHRINE BITARTRATE/D5W 8 MG/250ML
.5-3 PLASTIC BAG, INJECTION (ML) INTRAVENOUS
Status: DISCONTINUED | OUTPATIENT
Start: 2019-01-01 | End: 2019-01-01

## 2019-01-01 RX ORDER — FUROSEMIDE 40 MG/1
40 TABLET ORAL
COMMUNITY
Start: 2019-01-01

## 2019-01-01 RX ORDER — SODIUM CHLORIDE 9 MG/ML
1000 INJECTION, SOLUTION INTRAVENOUS ONCE
Status: COMPLETED | OUTPATIENT
Start: 2019-01-01 | End: 2019-01-01

## 2019-01-01 RX ORDER — VANCOMYCIN HYDROCHLORIDE
1250
Status: DISCONTINUED | OUTPATIENT
Start: 2019-01-01 | End: 2019-01-01

## 2019-01-01 RX ORDER — FLUTICASONE PROPIONATE 50 MCG
1 SPRAY, SUSPENSION (ML) NASAL
COMMUNITY
Start: 2019-01-01

## 2019-01-01 RX ORDER — MIDAZOLAM HYDROCHLORIDE 1 MG/ML
1-2 INJECTION, SOLUTION INTRAMUSCULAR; INTRAVENOUS
Status: DISCONTINUED | OUTPATIENT
Start: 2019-01-01 | End: 2019-01-01 | Stop reason: HOSPADM

## 2019-01-01 RX ORDER — METOPROLOL SUCCINATE 50 MG/1
50 TABLET, EXTENDED RELEASE ORAL
COMMUNITY
Start: 2019-01-01

## 2019-01-01 RX ORDER — SODIUM CHLORIDE 9 MG/ML
25 INJECTION, SOLUTION INTRAVENOUS CONTINUOUS
Status: DISCONTINUED | OUTPATIENT
Start: 2019-01-01 | End: 2019-01-01

## 2019-01-01 RX ORDER — EPINEPHRINE 0.1 MG/ML
INJECTION INTRACARDIAC; INTRAVENOUS
Status: DISPENSED
Start: 2019-01-01 | End: 2019-01-01

## 2019-01-01 RX ORDER — AMIODARONE HYDROCHLORIDE 200 MG/1
200 TABLET ORAL DAILY
Status: DISCONTINUED | OUTPATIENT
Start: 2019-01-01 | End: 2019-01-01

## 2019-01-01 RX ORDER — SODIUM CHLORIDE 9 MG/ML
250 INJECTION, SOLUTION INTRAVENOUS AS NEEDED
Status: DISCONTINUED | OUTPATIENT
Start: 2019-01-01 | End: 2019-01-01

## 2019-01-01 RX ORDER — HYDROCORTISONE SODIUM SUCCINATE 100 MG/2ML
50 INJECTION, POWDER, FOR SOLUTION INTRAMUSCULAR; INTRAVENOUS EVERY 6 HOURS
Status: DISCONTINUED | OUTPATIENT
Start: 2019-01-01 | End: 2019-01-01

## 2019-01-01 RX ORDER — DEXTROSE 50 % IN WATER (D50W) INTRAVENOUS SYRINGE
25-50 AS NEEDED
Status: DISCONTINUED | OUTPATIENT
Start: 2019-01-01 | End: 2019-01-01

## 2019-01-01 RX ORDER — ALBUMIN HUMAN 50 G/1000ML
25 SOLUTION INTRAVENOUS ONCE
Status: COMPLETED | OUTPATIENT
Start: 2019-01-01 | End: 2019-01-01

## 2019-01-01 RX ORDER — KETAMINE HCL 50MG/ML(1)
150 SYRINGE (ML) INTRAVENOUS ONCE
Status: COMPLETED | OUTPATIENT
Start: 2019-01-01 | End: 2019-01-01

## 2019-01-01 RX ORDER — VANCOMYCIN 2 GRAM/500 ML IN 0.9 % SODIUM CHLORIDE INTRAVENOUS
2000 ONCE
Status: COMPLETED | OUTPATIENT
Start: 2019-01-01 | End: 2019-01-01

## 2019-01-01 RX ORDER — PHENYLEPHRINE HCL IN 0.9% NACL 1 MG/10 ML
SYRINGE (ML) INTRAVENOUS
Status: COMPLETED
Start: 2019-01-01 | End: 2019-01-01

## 2019-01-01 RX ADMIN — Medication 10 MCG/MIN: at 15:01

## 2019-01-01 RX ADMIN — AMIODARONE HYDROCHLORIDE 150 MG: 1.5 INJECTION, SOLUTION INTRAVENOUS at 21:58

## 2019-01-01 RX ADMIN — VANCOMYCIN HYDROCHLORIDE 2000 MG: 10 INJECTION, POWDER, LYOPHILIZED, FOR SOLUTION INTRAVENOUS at 03:00

## 2019-01-01 RX ADMIN — HYDROCORTISONE SODIUM SUCCINATE 50 MG: 100 INJECTION, POWDER, FOR SOLUTION INTRAMUSCULAR; INTRAVENOUS at 02:21

## 2019-01-01 RX ADMIN — Medication 150 MG: at 14:00

## 2019-01-01 RX ADMIN — FAMOTIDINE 20 MG: 10 INJECTION, SOLUTION INTRAVENOUS at 21:35

## 2019-01-01 RX ADMIN — INSULIN LISPRO 2 UNITS: 100 INJECTION, SOLUTION INTRAVENOUS; SUBCUTANEOUS at 23:30

## 2019-01-01 RX ADMIN — SODIUM CHLORIDE 1000 ML: 900 INJECTION, SOLUTION INTRAVENOUS at 13:20

## 2019-01-01 RX ADMIN — FUROSEMIDE 20 MG: 10 INJECTION, SOLUTION INTRAMUSCULAR; INTRAVENOUS at 20:07

## 2019-01-01 RX ADMIN — SODIUM CHLORIDE 1000 ML: 900 INJECTION, SOLUTION INTRAVENOUS at 18:14

## 2019-01-01 RX ADMIN — INSULIN LISPRO 2 UNITS: 100 INJECTION, SOLUTION INTRAVENOUS; SUBCUTANEOUS at 00:06

## 2019-01-01 RX ADMIN — SODIUM CHLORIDE 1000 ML: 900 INJECTION, SOLUTION INTRAVENOUS at 15:00

## 2019-01-01 RX ADMIN — PIPERACILLIN AND TAZOBACTAM 3.38 G: 3; .375 INJECTION, POWDER, FOR SOLUTION INTRAVENOUS at 06:36

## 2019-01-01 RX ADMIN — PIPERACILLIN AND TAZOBACTAM 3.38 G: 3; .375 INJECTION, POWDER, FOR SOLUTION INTRAVENOUS at 18:11

## 2019-01-01 RX ADMIN — PIPERACILLIN AND TAZOBACTAM 3.38 G: 3; .375 INJECTION, POWDER, FOR SOLUTION INTRAVENOUS at 18:06

## 2019-01-01 RX ADMIN — PIPERACILLIN AND TAZOBACTAM 3.38 G: 3; .375 INJECTION, POWDER, FOR SOLUTION INTRAVENOUS at 23:29

## 2019-01-01 RX ADMIN — SODIUM CHLORIDE 0.99 ML: 900 INJECTION, SOLUTION INTRAVENOUS at 19:21

## 2019-01-01 RX ADMIN — MIDAZOLAM 2 MG: 1 INJECTION INTRAMUSCULAR; INTRAVENOUS at 08:41

## 2019-01-01 RX ADMIN — CHLORHEXIDINE GLUCONATE 0.12% ORAL RINSE 10 ML: 1.2 LIQUID ORAL at 18:06

## 2019-01-01 RX ADMIN — SODIUM CHLORIDE 50 ML/HR: 900 INJECTION, SOLUTION INTRAVENOUS at 20:20

## 2019-01-01 RX ADMIN — VANCOMYCIN HYDROCHLORIDE 1250 MG: 10 INJECTION, POWDER, LYOPHILIZED, FOR SOLUTION INTRAVENOUS at 20:21

## 2019-01-01 RX ADMIN — HEPARIN SODIUM 18 UNITS/KG/HR: 10000 INJECTION, SOLUTION INTRAVENOUS at 00:13

## 2019-01-01 RX ADMIN — Medication 10 MCG/MIN: at 06:39

## 2019-01-01 RX ADMIN — PIPERACILLIN AND TAZOBACTAM 3.38 G: 3; .375 INJECTION, POWDER, FOR SOLUTION INTRAVENOUS at 14:16

## 2019-01-01 RX ADMIN — CHLORHEXIDINE GLUCONATE 0.12% ORAL RINSE 10 ML: 1.2 LIQUID ORAL at 20:12

## 2019-01-01 RX ADMIN — AMIODARONE HYDROCHLORIDE 0.5 MG/MIN: 1.8 INJECTION, SOLUTION INTRAVENOUS at 04:29

## 2019-01-01 RX ADMIN — HYDROCORTISONE SODIUM SUCCINATE 50 MG: 100 INJECTION, POWDER, FOR SOLUTION INTRAMUSCULAR; INTRAVENOUS at 07:59

## 2019-01-01 RX ADMIN — Medication 10 MCG/MIN: at 18:07

## 2019-01-01 RX ADMIN — SODIUM CHLORIDE 1000 ML: 900 INJECTION, SOLUTION INTRAVENOUS at 17:27

## 2019-01-01 RX ADMIN — AMIODARONE HYDROCHLORIDE 1 MG/MIN: 1.8 INJECTION, SOLUTION INTRAVENOUS at 21:59

## 2019-01-01 RX ADMIN — MIDAZOLAM 2 MG: 1 INJECTION INTRAMUSCULAR; INTRAVENOUS at 09:35

## 2019-01-01 RX ADMIN — PIPERACILLIN AND TAZOBACTAM 3.38 G: 3; .375 INJECTION, POWDER, FOR SOLUTION INTRAVENOUS at 11:49

## 2019-01-01 RX ADMIN — ALBUMIN (HUMAN) 25 G: 12.5 INJECTION, SOLUTION INTRAVENOUS at 23:28

## 2019-01-01 RX ADMIN — FENTANYL CITRATE 100 MCG: 50 INJECTION, SOLUTION INTRAMUSCULAR; INTRAVENOUS at 09:28

## 2019-01-01 RX ADMIN — Medication 600 MCG: at 14:00

## 2019-01-01 RX ADMIN — PIPERACILLIN AND TAZOBACTAM 3.38 G: 3; .375 INJECTION, POWDER, FOR SOLUTION INTRAVENOUS at 00:10

## 2019-01-01 RX ADMIN — SODIUM CHLORIDE 50 ML/HR: 900 INJECTION, SOLUTION INTRAVENOUS at 00:03

## 2019-01-01 RX ADMIN — PIPERACILLIN AND TAZOBACTAM 3.38 G: 3; .375 INJECTION, POWDER, FOR SOLUTION INTRAVENOUS at 05:11

## 2019-01-01 RX ADMIN — FENTANYL CITRATE 100 MCG: 50 INJECTION, SOLUTION INTRAMUSCULAR; INTRAVENOUS at 08:13

## 2019-01-01 RX ADMIN — PHYTONADIONE 10 MG: 10 INJECTION, EMULSION INTRAMUSCULAR; INTRAVENOUS; SUBCUTANEOUS at 07:59

## 2019-01-01 RX ADMIN — HEPARIN SODIUM 15 UNITS/KG/HR: 10000 INJECTION, SOLUTION INTRAVENOUS at 08:30

## 2019-01-01 RX ADMIN — SODIUM CHLORIDE SOLN NEBU 3% 4 ML: 3 NEBU SOLN at 07:51

## 2019-01-01 RX ADMIN — INSULIN LISPRO 2 UNITS: 100 INJECTION, SOLUTION INTRAVENOUS; SUBCUTANEOUS at 06:59

## 2019-01-01 RX ADMIN — MIDAZOLAM 2 MG: 1 INJECTION INTRAMUSCULAR; INTRAVENOUS at 09:28

## 2019-12-07 PROBLEM — C34.91 ADENOCARCINOMA OF RIGHT LUNG (HCC): Status: ACTIVE | Noted: 2019-01-01

## 2019-12-07 PROBLEM — E66.01 OBESITY, MORBID, BMI 40.0-49.9 (HCC): Status: ACTIVE | Noted: 2019-01-01

## 2019-12-07 PROBLEM — E11.9 CONTROLLED TYPE 2 DIABETES MELLITUS WITHOUT COMPLICATION, WITHOUT LONG-TERM CURRENT USE OF INSULIN (HCC): Status: ACTIVE | Noted: 2019-01-01

## 2019-12-07 PROBLEM — R77.8 ELEVATED TROPONIN: Status: ACTIVE | Noted: 2019-01-01

## 2019-12-07 PROBLEM — I31.39 PERICARDIAL EFFUSION: Status: ACTIVE | Noted: 2019-01-01

## 2019-12-07 PROBLEM — I48.0 PAF (PAROXYSMAL ATRIAL FIBRILLATION) (HCC): Status: ACTIVE | Noted: 2019-01-01

## 2019-12-07 PROBLEM — J90 PLEURAL EFFUSION: Status: ACTIVE | Noted: 2019-01-01

## 2019-12-07 PROBLEM — I50.9 CHF (CONGESTIVE HEART FAILURE) (HCC): Status: ACTIVE | Noted: 2019-01-01

## 2019-12-07 PROBLEM — N17.9 ACUTE KIDNEY INJURY (HCC): Status: ACTIVE | Noted: 2019-01-01

## 2019-12-07 PROBLEM — R41.82 ALTERED MENTAL STATUS: Status: ACTIVE | Noted: 2019-01-01

## 2019-12-07 PROBLEM — A41.9 SEPSIS (HCC): Status: ACTIVE | Noted: 2019-01-01

## 2019-12-07 PROBLEM — I26.94 MULTIPLE SUBSEGMENTAL PULMONARY EMBOLI WITHOUT ACUTE COR PULMONALE (HCC): Status: ACTIVE | Noted: 2019-01-01

## 2019-12-07 NOTE — ED PROVIDER NOTES
[employer] [facility] [place] EMERGENCY DEPT 
 
 
12:59 PM 
 
Date: 12/7/2019 Patient Name: Judge Tay History of Presenting Illness Chief Complaint Patient presents with  Altered mental status  Fatigue Emergency department emergency department 62 y.o. female with noted past medical history who presents to the emergency department with altered mental status. The patient arrived by fire rescue with altered mental status. Per family at the bedside which is actually a nonrelated friend which she states is like a daughter to the patient, patient got discharged from [facility] yesterday for fluid around her heart which should give her Lasix but no procedures were done. Family friend/non-blood related \"daughter\" states that she got the history from the patient's sons who the patient lives with. Per that relayed history, the patient when she got home from the hospital yesterday was okay until sometime after 1:00 in the afternoon yesterday when she was no longer responding to them normally. She states that the son said that she called at 3 of the sons names but other than that was not interacting with them. This morning the sons found her lying on the floor and not responding that she typically does. Fire rescue was called she was transferred to the ER for evaluation treatment. Patient denies any other associated signs or symptoms. Patient denies any other complaints. Nursing notes regarding the HPI and triage nursing notes were reviewed. Prior medical records were reviewed. Past History Past Medical History: No past medical history on file. Past Surgical History: No past surgical history on file. Family History: No family history on file. Social History: 
Social History Tobacco Use  Smoking status: Not on file Substance Use Topics  Alcohol use: Not on file  Drug use: Not on file Allergies: 
No Known Allergies Patient's primary care provider (as noted in EPIC):  None REVIEW OF SYSTEMS:  Limited secondary to altered mental status. If ROS is provided, it came from collateral sources such as family, friends, or nursing faclity where appropriate. Visit Vitals /62 Pulse 85 Temp 98 °F (36.7 °C) Resp 29 Wt 99.8 kg (220 lb) SpO2 97% PHYSICAL EXAM: 
 
CONSTITUTIONAL:  Well developed;  well nourished. Limited secondary to altered mental status. HEAD:  Normocephalic, atraumatic. EYES:  Non-icteric sclera. Normal conjunctiva. Limited secondary to altered mental status. ENTM:  Nose:  no rhinorrhea. Throat:  no erythema or exudate, mucous membranes moist. 
NECK:  No JVD. Limited secondary to altered mental status. RESPIRATORY:  Chest clear, equal breath sounds, good air movement. CARDIOVASCULAR:  Regular rate and rhythm. No murmurs, rubs, or gallops. GI:  Normal bowel sounds, abdomen soft. Limited secondary to altered mental status. BACK:  Non-tender. UPPER EXT:  Normal inspection. LOWER EXT:  No edema, no calf tenderness. Distal pulses intact. NEURO:  Limited secondary to altered mental status. SKIN:  No rashes;  Normal for age. PSYCH:  Limited secondary to altered mental status. DIFFERENTIAL DIAGNOSES/ MEDICAL DECISION MAKING: 
Hypoglycemia, acute alcohol, drug, or multipharmacy intoxication, sepsis from numerous possible sources including urosepsis, pneumonia, meningitis, significant CVA, TIA, intracerebral hemorrhage, subdural hemorrhage, seizure, significant trauma, electrolyte or hormonal imbalance, other etiologies, versus a combination of the above. Diagnostic Study Results Abnormal lab results from this emergency department encounter: 
Labs Reviewed CBC WITH AUTOMATED DIFF - Abnormal; Notable for the following components:  
    Result Value WBC 18.7 (*) NEUTROPHILS 86 (*) LYMPHOCYTES 5 (*)   
 ABS. NEUTROPHILS 16.1 (*) ABS. MONOCYTES 1.7 (*) All other components within normal limits METABOLIC PANEL, COMPREHENSIVE - Abnormal; Notable for the following components:  
 Sodium 132 (*) Chloride 99 (*) Glucose 265 (*) BUN 34 (*) Creatinine 1.80 (*)   
 GFR est AA 35 (*)   
 GFR est non-AA 29 (*) Alk. phosphatase 170 (*) Albumin 2.3 (*) Globulin 5.6 (*) A-G Ratio 0.4 (*) All other components within normal limits CARDIAC PANEL,(CK, CKMB & TROPONIN) - Abnormal; Notable for the following components:  
 CK-MB Index 7.2 (*) Troponin-I, QT 0.29 (*) All other components within normal limits AMMONIA - Abnormal; Notable for the following components:  
 Ammonia 64 (*) All other components within normal limits URINALYSIS W/ RFLX MICROSCOPIC - Abnormal; Notable for the following components:  
 Bilirubin SMALL (*) Urobilinogen 4.0 (*) All other components within normal limits POC LACTIC ACID - Abnormal; Notable for the following components:  
 Lactic Acid (POC) 4.71 (*) All other components within normal limits POC LACTIC ACID - Abnormal; Notable for the following components:  
 Lactic Acid (POC) 4.72 (*) All other components within normal limits CULTURE, URINE  
CULTURE, BLOOD CULTURE, BLOOD INFLUENZA A & B AG (RAPID TEST) MAGNESIUM  
ETHYL ALCOHOL  
DRUG SCREEN, URINE Lab values for this patient within approximately the last 12 hours: 
Recent Results (from the past 12 hour(s)) CBC WITH AUTOMATED DIFF Collection Time: 12/07/19 12:41 PM  
Result Value Ref Range WBC 18.7 (H) 4.6 - 13.2 K/uL  
 RBC 4.20 4. 20 - 5.30 M/uL  
 HGB 12.4 12.0 - 16.0 g/dL HCT 36.6 35.0 - 45.0 % MCV 87.1 74.0 - 97.0 FL  
 MCH 29.5 24.0 - 34.0 PG  
 MCHC 33.9 31.0 - 37.0 g/dL  
 RDW 13.8 11.6 - 14.5 % PLATELET 627 472 - 400 K/uL MPV 9.8 9.2 - 11.8 FL  
 NEUTROPHILS 86 (H) 42 - 75 % LYMPHOCYTES 5 (L) 20 - 51 % MONOCYTES 9 2 - 9 % EOSINOPHILS 0 0 - 5 % BASOPHILS 0 0 - 3 %  
 ABS. NEUTROPHILS 16.1 (H) 1.8 - 8.0 K/UL  
 ABS. LYMPHOCYTES 0.9 0.8 - 3.5 K/UL  
 ABS. MONOCYTES 1.7 (H) 0 - 1.0 K/UL  
 ABS. EOSINOPHILS 0.0 0.0 - 0.4 K/UL  
 ABS. BASOPHILS 0.0 0.0 - 0.06 K/UL  
 DF MANUAL PLATELET COMMENTS ADEQUATE PLATELETS    
 RBC COMMENTS NORMOCYTIC, NORMOCHROMIC METABOLIC PANEL, COMPREHENSIVE Collection Time: 12/07/19 12:41 PM  
Result Value Ref Range Sodium 132 (L) 136 - 145 mmol/L Potassium 4.7 3.5 - 5.5 mmol/L Chloride 99 (L) 100 - 111 mmol/L  
 CO2 21 21 - 32 mmol/L Anion gap 12 3.0 - 18 mmol/L Glucose 265 (H) 74 - 99 mg/dL BUN 34 (H) 7.0 - 18 MG/DL Creatinine 1.80 (H) 0.6 - 1.3 MG/DL  
 BUN/Creatinine ratio 19 12 - 20 GFR est AA 35 (L) >60 ml/min/1.73m2 GFR est non-AA 29 (L) >60 ml/min/1.73m2 Calcium 8.7 8.5 - 10.1 MG/DL Bilirubin, total 0.6 0.2 - 1.0 MG/DL  
 ALT (SGPT) 34 13 - 56 U/L  
 AST (SGOT) 32 10 - 38 U/L Alk. phosphatase 170 (H) 45 - 117 U/L Protein, total 7.9 6.4 - 8.2 g/dL Albumin 2.3 (L) 3.4 - 5.0 g/dL Globulin 5.6 (H) 2.0 - 4.0 g/dL A-G Ratio 0.4 (L) 0.8 - 1.7 MAGNESIUM Collection Time: 12/07/19 12:41 PM  
Result Value Ref Range Magnesium 2.5 1.6 - 2.6 mg/dL CARDIAC PANEL,(CK, CKMB & TROPONIN) Collection Time: 12/07/19 12:41 PM  
Result Value Ref Range CK 36 26 - 192 U/L  
 CK - MB 2.6 <3.6 ng/ml CK-MB Index 7.2 (H) 0.0 - 4.0 % Troponin-I, QT 0.29 (H) 0.0 - 0.045 NG/ML  
ETHYL ALCOHOL Collection Time: 12/07/19 12:41 PM  
Result Value Ref Range ALCOHOL(ETHYL),SERUM <3 0 - 3 MG/DL POC LACTIC ACID Collection Time: 12/07/19  2:01 PM  
Result Value Ref Range Lactic Acid (POC) 4.71 (HH) 0.40 - 2.00 mmol/L  
URINALYSIS W/ RFLX MICROSCOPIC Collection Time: 12/07/19  3:50 PM  
Result Value Ref Range Color DARK YELLOW Appearance CLOUDY Specific gravity 1.019 1.005 - 1.030    
 pH (UA) 5.0 5.0 - 8.0 Protein NEGATIVE  NEG mg/dL Glucose NEGATIVE  NEG mg/dL Ketone NEGATIVE  NEG mg/dL Bilirubin SMALL (A) NEG Blood NEGATIVE  NEG Urobilinogen 4.0 (H) 0.2 - 1.0 EU/dL Nitrites NEGATIVE  NEG Leukocyte Esterase NEGATIVE  NEG    
DRUG SCREEN, URINE Collection Time: 12/07/19  3:50 PM  
Result Value Ref Range BENZODIAZEPINES NEGATIVE  NEG    
 BARBITURATES NEGATIVE  NEG    
 THC (TH-CANNABINOL) NEGATIVE  NEG    
 OPIATES NEGATIVE  NEG    
 PCP(PHENCYCLIDINE) NEGATIVE  NEG    
 COCAINE NEGATIVE  NEG    
 AMPHETAMINES NEGATIVE  NEG METHADONE NEGATIVE  NEG HDSCOM (NOTE) POC LACTIC ACID Collection Time: 12/07/19  4:22 PM  
Result Value Ref Range Lactic Acid (POC) 4.72 (HH) 0.40 - 2.00 mmol/L  
AMMONIA Collection Time: 12/07/19  4:25 PM  
Result Value Ref Range Ammonia 64 (H) 11 - 32 UMOL/L Radiologist and cardiologist interpretations if available at time of this note: 
Ct Head Wo Cont Result Date: 12/7/2019 CT OF THE HEAD WITHOUT CONTRAST. CLINICAL HISTORY: Altered mental status after trauma. Subtle generalized weakness and altered mental status. Lung mass diagnosed 2.5 weeks ago. TECHNIQUE: Helical scan obtained of the head were obtained from the skull vertex through the base of the skull without intravenous contrast.    All CT scans are performed using dose optimization techniques as appropriate to the performed exam including the following: Automated exposure control, adjustment of mA and/or kV according to patient size, and use of iterative reconstructive technique. COMPARISON: None. FINDINGS: Right parietal infarct with developing encephalomalacia and localized sulcal effacement is likely at least subacute-chronic. Additional evidence of small subtle infarct at the left parietal lobe, more age indeterminate. Small bilateral cerebellar lacunar infarcts. No intracranial hemorrhage. No mass effect. The visualized paranasal sinuses are clear. The mastoid air cells are clear.  The visualized bony structures are unremarkable. IMPRESSION: No acute traumatic findings. Age-indeterminate small left parietal lobe infarct. MRI could be utilized to assess for acuity. Right parietal infarct appears at least late subacute. Small cerebellar infarcts, likely chronic. Medication(s) ordered for patient during this emergency visit encounter: 
Medications  
piperacillin-tazobactam (ZOSYN) 3.375 g in 0.9% sodium chloride (MBP/ADV) 100 mL MBP (has no administration in time range)  
sodium chloride (NS) flush 5-10 mL (has no administration in time range)  
sodium chloride 0.9 % bolus infusion 1,000 mL (has no administration in time range) Followed by  
sodium chloride 0.9 % bolus infusion 1,000 mL (1,000 mL IntraVENous New Bag 12/7/19 1727) Followed by  
sodium chloride 0.9 % bolus infusion 994 mL (has no administration in time range)  
sodium chloride 0.9 % bolus infusion 1,000 mL (1,000 mL IntraVENous New Bag 12/7/19 1320) Patient Allergies Needed (1 Each Other Given 12/7/19 1643) Medical Decision Making I am the first provider for this patient. I reviewed the vital signs, available nursing notes, past medical history, past surgical history, family history and social history. Vital Signs:  Reviewed the patient's vital signs. ED COURSE:   
1:17 PM 
Patient does say limited things and is responsive to noxious stimuli. She does move all 4 extremities to noxious stimuli as well. When I sternal rubbed her chest she asked me while doing it to her. She does recognize the \"daughter\" and called her by her name Barbara Cables. Nonetheless the patient appears somnolent and certainly is not acting normal and is not at her baseline per the \"daughter\" at the bedside. Will do a stroke work-up as well as work-up for infectious processes as well as metabolic etiologies. Critical Care Note:  Altered Mental Status Critical care minutes: 129 MINUTES. Given patients presentation to ed with noted change in mental status, numerous serial neurological examinations were performed, as well as evaluation of vital signs. Given the patients underlying condition medical intervention(s) were needed, requiring numerous reevaluations of patient's vital signs and response to different emergency department therapies, total bedside time evaluating and/or treating the patient, not including procedures, is noted below. Admit to Hospitalist 
 
The patient was presented to the accepting hospitalist, Dr. Mary Lou Link. The patient's primary doctor is None, and admissions for this physician are with the hospitalist.  If the patient has no primary doctor, then admission is to the hospitalist as well. As the emergency physician, I wrote courtesy admission orders for the hospitalist physician. The courtesy orders included explicit instructions for the floor nursing staff to call the admitting attending physician upon patient arrival on the floor. IMPRESSION AND MEDICAL DECISION MAKING: 
Based upon the patients presentation with noted HPI and PE, along with the work up done in the emergency department, I believe that the patient is having altered mental status of uncertain etiology. However, given the history of presenting illness as well as the patients risk factor, I believe that the patient should be admitted for further evaluation and work up of the altered mental status. DIAGNOSIS: 
1. Altered mental status. Coding Diagnoses Clinical Impression: 1. Altered mental status, unspecified altered mental status type 2. Elevated troponin 3. Elevated lactic acid level Disposition Disposition:  Admit. DOUGIE Polanco Board Certified Emergency Physician Provider Attestation: If a scribe was utilized in generation of this patient record, I personally performed the services described in the documentation, reviewed the documentation, as recorded by the scribe in my presence, and it accurately records the patient's history of presenting illness, review of systems, patient physical examination, and procedures performed by me as the attending physician. Kyler Gutierrez M.D. [place] Board Certified Emergency Physician 
12/7/2019. 
1:17 PM

## 2019-12-07 NOTE — PROGRESS NOTES
PT WAS MOVING AND WOULD NOT HOLD STILL , DURING SCAN PT ALTERED MENTAL STATUS , AS PER RADIOLOGIST PT TO BE RE-SCANNED , PT WAS PUT INTO TRANSPORT .

## 2019-12-07 NOTE — ED TRIAGE NOTES
EMS reports sudden generalized weakness and AMS;  Dx with mass of lung 2.5 weeks ago; O2 sat 91 %; placed on 3 L NC; ; SR with HR 76

## 2019-12-08 NOTE — CONSULTS
Consult Note Consult requested by: Dr. Rhett Morgan is a 62 y.o. female [address] who is being seen on consult for renal failure Chief Complaint Patient presents with  Altered mental status  Fatigue Admission diagnosis: altered mental status,  
 
55y F with PMH lung ca, heart failure, afib, HTN, admitted for altered mental status, seen for renal failure Impression & Plan:  
IMPRESSION:  
FISH, multiple risk factor, sepsis, overdiuresis Hyponatremia, improving Altered mental status, Respiratory failure, on bipap Sepsis, source suspect lung, on abx Heart failure Right lung adencocarcinoma, right pleural effusion, PLAN:  
 Her renal function is recovering, will decrease IV fluid . Monitor urine output and urine retention. abx per primary team.  
Following closely. Discussed with primary team, Dr. Gianna Wu 
Thank you very much for allowing me to participate in the care of this patient. We will continue to monitor with you and make recommendations as needed. Ms. Rach Nuno is on bipap, unable to provide any history , History was obtained from chart review and discussion with primary team.  
HPI:  55y F with HTN rencetly diagnosed with lung cancer, h/p pericardiac effusion, s/p pericardia window creation, afib, on anticoagulation, PE ,just discharged from [address], was brought to ER by EMS for altered mental status. In ER her vital shows NO documented hypoxia or hypotension or fever. She has leucocytosis, elevated lactate, FISH on her lab. Her CT head was negative for any acute pathology. She received abx and IV fluid per sepsis protocol. Reviewed her chart from [facility] her creatinine was at 0.8mg/dl on discharge. She had episode of hyponatremia, which is improving. In ER she does not have any qureshi catheter. Past Medical History:  
Diagnosis Date  Adenocarcinoma of lung ([address]  CHF [facility]congestive heart failure) [facility] [address]  DM (diabetes mellitus), type 2 ([facility]  Left leg DVT ([facility] [address] [facility]  Pericardial effusion  Pleural effusion  [facility] No past surgical history on file. Social History Socioeconomic History  Marital status:  Spouse name: Not on file  Number of children: Not on file  Years of education: Not on file  Highest education level: Not on file Occupational History  Not on file Social Needs  Financial resource strain: Not on file  Food insecurity:  
  Worry: Not on file Inability: Not on file  Transportation needs:  
  Medical: Not on file Non-medical: Not on file Tobacco Use  Smoking status: Not on file Substance and Sexual Activity  Alcohol use: Not on file  Drug use: Not on file  Sexual activity: Not on file Lifestyle  Physical activity:  
  Days per week: Not on file Minutes per session: Not on file  Stress: Not on file Relationships  Social connections:  
  Talks on phone: Not on file Gets together: Not on file Attends Alevism service: Not on file Active member of club or organization: Not on file Attends meetings of clubs or organizations: Not on file Relationship status: Not on file  Intimate partner violence:  
  Fear of current or ex partner: Not on file Emotionally abused: Not on file Physically abused: Not on file Forced sexual activity: Not on file Other Topics Concern  Not on file Social History Narrative  Not on file No family history on file. No Known Allergies Home Medications:  
 
Prior to Admission Medications Prescriptions Last Dose Informant Patient Reported? Taking? SITagliptin (JANUVIA) 50 mg tablet 12/6/2019 at Unknown time  Yes No  
Sig: Take 50 mg by mouth. amiodarone (CORDARONE) 200 mg tablet 12/6/2019 at Unknown time  Yes Yes Sig: Take 200 mg by mouth daily. apixaban (ELIQUIS) 5 mg tablet 2019 at Unknown time  Yes Yes Sig: Take 5 mg by mouth.  
benzonatate (TESSALON) 100 mg capsule Unknown at Unknown time  Yes No  
Sig: Take 1 capsule 3 times daily as needed for coughing, swallow capsules whole. fluticasone propionate (FLONASE) 50 mcg/actuation nasal spray Unknown at Unknown time  Yes No  
Si Spray. furosemide (LASIX) 40 mg tablet 2019 at Unknown time  Yes Yes Sig: Take 40 mg by mouth.  
metoprolol succinate (TOPROL-XL) 50 mg XL tablet 2019 at Unknown time  Yes Yes Sig: Take 50 mg by mouth.  
montelukast (SINGULAIR) 10 mg tablet 2019 at Unknown time  Yes Yes Sig: Take 10 mg by mouth. Facility-Administered Medications: None Current Facility-Administered Medications Medication Dose Route Frequency  0.9% sodium chloride infusion  125 mL/hr IntraVENous CONTINUOUS  
 Please update ht in Epic  1 Each Other ONCE  
 VANCOMYCIN INFORMATION NOTE   Other Rx Dosing/Monitoring  sodium chloride (NS) flush 5-10 mL  5-10 mL IntraVENous PRN  
 ondansetron (ZOFRAN) injection 4 mg  4 mg IntraVENous Q4H PRN  
 montelukast (SINGULAIR) tablet 10 mg  10 mg Oral QHS  insulin lispro (HUMALOG) injection   SubCUTAneous Q6H  
 glucose chewable tablet 16 g  4 Tab Oral PRN  
 glucagon (GLUCAGEN) injection 1 mg  1 mg IntraMUSCular PRN  
 dextrose (D50W) injection syrg 12.5-25 g  25-50 mL IntraVENous PRN  piperacillin-tazobactam (ZOSYN) 3.375 g in 0.9% sodium chloride (MBP/ADV) 100 mL MBP  3.375 g IntraVENous Q6H  Vancomycin random level due 19 at 1100  1 Each Other ONCE Current Outpatient Medications Medication Sig  
 amiodarone (CORDARONE) 200 mg tablet Take 200 mg by mouth daily.  apixaban (ELIQUIS) 5 mg tablet Take 5 mg by mouth.  furosemide (LASIX) 40 mg tablet Take 40 mg by mouth.  metoprolol succinate (TOPROL-XL) 50 mg XL tablet Take 50 mg by mouth.  montelukast (SINGULAIR) 10 mg tablet Take 10 mg by mouth.  benzonatate (TESSALON) 100 mg capsule Take 1 capsule 3 times daily as needed for coughing, swallow capsules whole.  SITagliptin (JANUVIA) 50 mg tablet Take 50 mg by mouth.  fluticasone propionate (FLONASE) 50 mcg/actuation nasal spray 1 Spray. Review of Systems: As above Data Review: 
 
Labs: Results:  
   
Chemistry Recent Labs 12/08/19 
0549 12/07/19 
1241 * 265*  132* K 4.8 4.7  99* CO2 19* 21 BUN 31* 34* CREA 1.25 1.80* CA 7.9* 8.7 AGAP 10 12 BUCR 25* 19  
AP  --  170* TP  --  7.9 ALB  --  2.3*  
GLOB  --  5.6* AGRAT  --  0.4* CBC w/Diff Recent Labs 12/08/19 
0549 12/07/19 
1241 WBC 19.9* 18.7*  
RBC 3.77* 4.20 HGB 11.0* 12.4 HCT 32.9* 36.6  326 GRANS 86* 86* LYMPH 7* 5* EOS 0 0 Coagulation Recent Labs 12/08/19 
4298 PTP 62.4* INR 7.3* APTT >180.0* Iron/Ferritin No results for input(s): IRON in the last 72 hours. No lab exists for component: TIBCCALC BNP No results for input(s): BNPP in the last 72 hours. Cardiac Enzymes Recent Labs 12/08/19 
0130 12/07/19 
1928  72 CKND1 3.4 4.9* Liver Enzymes Recent Labs 12/07/19 
1241 TP 7.9 ALB 2.3* * SGOT 32 Thyroid Studies No results found for: T4, T3U, TSH, TSHEXT    
 EKG:sinus Physical Assessment:  
 
Visit Vitals /82 Pulse 92 Temp 98 °F (36.7 °C) Resp 23 Wt 99.8 kg (220 lb) SpO2 97% Breastfeeding No  
 
Weight change:  
 
Intake/Output Summary (Last 24 hours) at 12/8/2019 1252 Last data filed at 12/8/2019 1058 Gross per 24 hour Intake 4186.1 ml Output  Net 4186.1 ml Physical Exam:  
General: comfortable, no acute distress HEENT sclera anicteric, supple neck, no thyromegaly CVS: S1S2 heard,  no rub RS: + air entry b/l, Abd: Soft, Non tender, Neuro: awake, alert Extrm:no  edema, no cyanosis, clubbing Skin: no visible  Rash Musculoskeletal: No gross joints or bone deformities Procedures/imaging: see electronic medical records for all procedures, Xrays and details which were not copied into this note but were reviewed prior to creation of Plan 
 
  
 
 
Kindra Caceres MD 
December 8, 2019 [facility] Nephrology Office [phone]

## 2019-12-08 NOTE — PROGRESS NOTES
Called by RN to evaluate the patient at bedside. Patient was evaluated at bedside. She is still in the ER. Her heart rate is anywhere from 80-90. Does not have any chest pain. Respiratory rate is 22 She is on BiPAP. Patient is n.p.o. We will start her on IV fluid 125 mL/h. Discussed with RN to start heparin drip as soon as possible. Her initial lactic acid at 2 PM was 4.71. At 4:22 PM it was 4.72 and the third lactate on 7:29 PM was 5.99. Will get a stat ABG and lactic acid. Her systolic blood pressure is 104.

## 2019-12-08 NOTE — PROGRESS NOTES
Rapid Response Note 120 [facility] Patient: Lamar Jordan 62 y.o. female [id number] 
1960 Admit Date: 12/7/2019 Admission Diagnosis: Altered mental status [R41.82] RAPID RESPONSE Rapid response called for respiratory distress. The patient was admitted for AMS and acute hypoxemic respiratory failure. The patient was ordered to be on Bipap but she was altered and was not tolerating the Bipap so she was only placed on nasal cannula at 7L. Patient has h/o significant PE and with pleural effusion which was drained at [facility] recently. The patient was discharged from the hospital 2 days prior to readmission to [facility]. The patient was initially started with Hep gtt in the beginning of her admission at [facility] but was discontinued due to elevated INR >7. Medications Reviewed ROS Patient denies pain, no chest pain. + SOB. OBJECTIVE Visit Vitals /67 Pulse 96 Temp 97.1 °F (36.2 °C) Resp 22 Ht 5' 6\" (1.676 m) Wt 99.8 kg (220 lb) SpO2 96% Breastfeeding No  
BMI 35.51 kg/m² Physical Exam 
Constitutional:   
   General: She is in acute distress. Cardiovascular:  
   Rate and Rhythm: Regular rhythm. Tachycardia present. Pulses: Normal pulses. Pulmonary:  
   Effort: Tachypnea, accessory muscle usage and respiratory distress present. Breath sounds: Examination of the left-upper field reveals decreased breath sounds. Examination of the left-middle field reveals decreased breath sounds. Examination of the left-lower field reveals decreased breath sounds. Decreased breath sounds present. No wheezing, rhonchi or rales. Abdominal:  
   Palpations: Abdomen is soft. Musculoskeletal:  
   Right lower leg: No edema. Left lower leg: No edema. Skin: 
   General: Skin is warm and dry. Neurological:  
   Mental Status: She is alert. Medications administered: Lasix 20mg IV 
 
EKG: sinus tachycardia, low voltage. Labs: cardiac panel, coag, BMP, ABG 
 
ASSESSMENT, PLAN & DISPOSITION Ayden Kraus is a 62y.o. year old female admitted for Altered mental status [R41.82]. Rapid response called for respiratory distress. Patient condition currently: guarded. On arrival, nurse stated that the patient had been working to breath ever since arriving to the floor from the ED. She was initially on Bipap, however due to AMS has not been tolerating and kept pulling it off. SPO2 100% on 7LO2NC on arrival, patient tachypenic with significant respiratory muscle use. She was placed on HFNC and work of breathing seemed to improve. CXR appeared similar to CXR yesterday with right pleural effusion and large opacities in left lung. SPO2 was maintained at 100% throughout rapid. BP stable in 110's-130's/70's-80's. ABG pending. Dr. Gianna Wu to take over care. Disposition: CVT Stepdown Attending Dr. Gianna Wu notified of rapid response. In agreement with plan, asked for ABG to be done STAT and agreed with 20 IV Lasix. Primary team resuming care. Senior resident Dr. Freeman present during rapid. Bradley George, PGY-1  
[facility] Medicine Intern Pager: [phone] December 8, 2019, 8:08 PM

## 2019-12-08 NOTE — H&P
Hospitalist Admission History and Physical 
 
NAME:  Kasandra Malagon :   1960 MRN:   [id number] PCP:  None 
Date/Time:  2019 7:39 PM 
 
Subjective: CHIEF COMPLAINT:  Altered mental status since earlier today HISTORY OF PRESENT ILLNESS:    
Jelena Franklin is a 62 y.o.  female who presents with altered mental status since earlier today. Patient was discharged from [facility] yesterday at which point sister felt she was talking like her normal self. This morning family noted her to be altered, refusing to get off the floor ultimately resulted in presentation to the ED. Since presentation to ED patient has been restless and confused. Patient poorly responding to questions other than stating at times that she is short of breath. Patient does not offer any other complaints including no CP, abd pain or nausea. Social History Tobacco Use  Smoking status: Not on file Substance Use Topics  Alcohol use: Not on file Past Medical History:  
Diagnosis Date  [facility]en[facility]  CHF (congestive heart failure) ([facility]  DM (diabetes mellitus), type 2 ([facility]  Left leg DVT ([facility]  Pericardial effusion  Pleural effusion  Pulmonary emboli ([facility] No Known Allergies Prior to Admission Medications Prescriptions Last Dose Informant Patient Reported? Taking? SITagliptin (JANUVIA) 50 mg tablet Unknown at Unknown time  Yes No  
Sig: Take 50 mg by mouth. amiodarone (CORDARONE) 200 mg tablet 2019 at Unknown time  Yes Yes Sig: Take 200 mg by mouth daily. apixaban (ELIQUIS) 5 mg tablet 2019 at Unknown time  Yes Yes Sig: Take 5 mg by mouth.  
benzonatate (TESSALON) 100 mg capsule Unknown at Unknown time  Yes No  
Sig: Take 1 capsule 3 times daily as needed for coughing, swallow capsules whole. fluticasone propionate (FLONASE) 50 mcg/actuation nasal spray Unknown at Unknown time  Yes No  
Si Spray. furosemide (LASIX) 40 mg tablet 2019 at Unknown time  Yes Yes Sig: Take 40 mg by mouth.  
metoprolol succinate (TOPROL-XL) 50 mg XL tablet 2019 at Unknown time  Yes Yes Sig: Take 50 mg by mouth.  
montelukast (SINGULAIR) 10 mg tablet 2019 at Unknown time  Yes Yes Sig: Take 10 mg by mouth. Facility-Administered Medications: None REVIEW OF SYSTEMS:   
 [x] Unable to obtain  ROS due to  [x]mental status change  []sedated   []intubated Limited due to mental status GENERAL: restless HEENT: no sinus congestion / hearing or vision changes; denies HA 
NECK: No pain or stiffness. PULMONARY: + SOB, c/o cough productive of white mucous Cardiovascular: denies palpitations; no lower extremity edema GASTROINTESTINAL: Denies abdominal pain, constipation, diarrhea, nausea or vomiting GENITOURINARY: No urinary complaints including no urgency / dysuria MUSCULOSKELETAL: no pain complains DERMATOLOGIC: denies pruritis or open areas ENDOCRINE: No polyuria, polydipsia, no heat or cold intolerance. HEMATOLOGICAL: No anemia or easy bruising or bleeding. NEUROLOGIC:  no disorientation or tremor Objective: VITALS:   
Visit Vitals /86 Pulse 82 Temp 98 °F (36.7 °C) Resp 25 Wt 99.8 kg (220 lb) SpO2 97% Temp (24hrs), Av °F (36.7 °C), Min:98 °F (36.7 °C), Max:98 °F (36.7 °C) PHYSICAL EXAM:  
General:          Alert, restless HEENT:           Pupils equal.  Sclera anicteric. Conjunctiva pink. Mucous membranes dry, no ear or nasal discharge Neck:               Supple. Trachea midline. No accessory muscle use. No thyromegaly. No jugular venous distention, no carotid bruit CV:                  Regular rate and rhythm. S1S2+ Lungs:             decreased breath sounds throughout; inc work of breath with wheeze during revisit Abdomen:        Soft, non-tender. Not distended.   Bowel sounds normal.  
 Extremities:     No cyanosis. No edema. Pulses 2+ b/l Neurologic:      Alert and oriented X 1. Follows commands, responds to stimuli. No focal neurological deficit was noted Skin:                Warm and dry. No rashes. LAB DATA REVIEWED:   
No components found for: [place] Recent Labs 12/07/19 
1241 * K 4.7 CL 99* CO2 21 BUN 34* CREA 1.80* * CA 8.7 ALB 2.3* WBC 18.7* HGB 12.4 HCT 36.6  IMAGING RESULTS: 
 
 [x]  I have personally reviewed the actual   [x]CXR  [x]CT scan Assessment/Plan: Active Problems: 
  Altered mental status (12/7/2019) 
 
 __________________________________________________ PLAN:   
1. Altered mental status - no evidence of UTI, + likely PNA - cont vanc/zosyn ; tx of hyperammonemia - lactulose enema ordered 2. Sepsis - leukocytosis with mild hypotension on admission likely r/t hosp acquired PNA; cont abx; asp precautions, SLP eval; follow lactic acid 3. Elevated troponin - follow serial troponins, consider elevation from pulm emboli; cardiology consulted 4. FISH - elevate at time of admission from recent [facility] visit; s/p IV hydration in ED; nephro consulted Dr. Brooke Powell to see 5. Moderately large R pleural effusion - per CXRay, consider pulmonary eval in AM if persists / ? Need for tap 6. Pulmonary emboli / L leg DVT - Hold oral agents; start heparin drip 7. Adenocarcinoma of lung - in process of starting palliative chemotherapy per [facility] records 8. Type 2 DM, controlled - SSI; A1c 7.5 9. Mild hyponatremia - persistent, suspected SIADH in setting of cancer dx Risk of deterioration:  []Low    []Moderate  [x]High Prophylaxis:  []Lovenox  []Coumadin  [x]Hep drip  []SCDs  []H2B/PPI Disposition:  []Home w/ Family   [x]HH PT,OT,RN   []SNF/LTC   []SAH/Rehab Discussed Code Status:    [x]Full Code      []DNR    
___________________________________________________ Care Plan discussed with: 
 [x]Patient   [x]Family    []ED Care Manager  [x]ED Doc   [x]Specialist :  cardiology Total Time Coordinating Admission:   70   minutes 
___________________________________________________ Admitting Provider: Kee Sales NP

## 2019-12-08 NOTE — PROGRESS NOTES
[facility] Hospitalist Group Progress Note Patient: Kasandra  Age: 62 y.o. : 1960 MR#: [id number] SSN: [id number] Date/Time: 2019 Subjective:  
 
Patient lying in bed , on BIPAP Assessment/Plan:  
 
- acute hypoxic resp failure - HCAP 
- Acute metabolic encephalopathy - Sepsis, Lactic acidosis - FISH 
- Recurrent R Pleural effusion 
- recently Diagnosed Lung adenoca, sees Dr Vielka Dumont ( [facility] ) 
- h/o PE and LLE DVT. INR today is 7.3 
- h/o recent pericardial effusion 
- DM2 
- elevated trop, likely demand ischemia PLAN 
BIPAP 
O2, nebs, bronchial hygiene Broad spectrum abx, follow cx 
Pulmonary consulted Cardiology following Check echo Monitor Coags Gentle hydration, nephro on  sugars, ssi Oncology consulted Case discussed with:  []Patient  []Family  []Nursing  []Case Management DVT Prophylaxis:  []Lovenox  []Hep SQ  []SCDs  []Coumadin   []On Heparin gtt Objective:  
VS:  
Visit Vitals /67 Pulse 96 Temp 97.1 °F (36.2 °C) Resp 22 Ht 5' 6\" (1.676 m) Wt 99.8 kg (220 lb) SpO2 96% Breastfeeding No  
BMI 35.51 kg/m² Tmax/24hrs: Temp (24hrs), Av.1 °F (36.2 °C), Min:97.1 °F (36.2 °C), Max:97.1 °F (36.2 °C) Input/Output:  
 
Intake/Output Summary (Last 24 hours) at 2019 1749 Last data filed at 2019 1058 Gross per 24 hour Intake 3186.1 ml Output  Net 3186.1 ml  
 
 
General:  Awake, on BIPAP Cardiovascular:  S1S2+, RRR Pulmonary:  Coarse BS b/l 
GI:  Soft, BS+, NT, ND Extremities:  No edema Labs:   
Recent Results (from the past 24 hour(s)) INFLUENZA A & B AG (RAPID TEST) Collection Time: 19  7:17 PM  
Result Value Ref Range Influenza A Antigen NEGATIVE  NEG Influenza B Antigen NEGATIVE  NEG    
GLUCOSE, POC Collection Time: 19  7:20 PM  
Result Value Ref Range Glucose (POC) 148 (H) 70 - 110 mg/dL CARDIAC PANEL,(CK, CKMB & TROPONIN) Collection Time: 12/07/19  7:28 PM  
Result Value Ref Range CK 72 26 - 192 U/L  
 CK - MB 3.5 <3.6 ng/ml CK-MB Index 4.9 (H) 0.0 - 4.0 % Troponin-I, QT 0.47 (H) 0.0 - 0.045 NG/ML  
POC LACTIC ACID Collection Time: 12/07/19  7:29 PM  
Result Value Ref Range Lactic Acid (POC) 5.99 (HH) 0.40 - 2.00 mmol/L  
POC G3 Collection Time: 12/07/19  8:08 PM  
Result Value Ref Range Device: BIPAP    
 FIO2 (POC) 0.50 % pH (POC) 7.263 (L) 7.35 - 7.45    
 pCO2 (POC) 26.3 (L) 35.0 - 45.0 MMHG  
 pO2 (POC) 106 (H) 80 - 100 MMHG  
 HCO3 (POC) 11.9 (L) 22 - 26 MMOL/L  
 sO2 (POC) 97 92 - 97 % Base deficit (POC) 14 mmol/L  
 PEEP/CPAP (POC) 5.0 cmH2O  
 PIP (POC) 18 Allens test (POC) YES Total resp. rate 31 Site RIGHT RADIAL Specimen type (POC) ARTERIAL Performed by Arabella Dennis Spontaneous timed YES    
POC G3 Collection Time: 12/07/19 11:24 PM  
Result Value Ref Range Device: BIPAP    
 FIO2 (POC) 50 % pH (POC) 7.377 7.35 - 7.45    
 pCO2 (POC) 27.4 (L) 35.0 - 45.0 MMHG  
 pO2 (POC) 110 (H) 80 - 100 MMHG  
 HCO3 (POC) 16.1 (L) 22 - 26 MMOL/L  
 sO2 (POC) 98 (H) 92 - 97 % Base deficit (POC) 8 mmol/L  
 PEEP/CPAP (POC) 5 cmH2O  
 PIP (POC) 14 Allens test (POC) YES Total resp. rate 30 Site RIGHT RADIAL Specimen type (POC) ARTERIAL Performed by Jhonatan Carbone CARDIAC PANEL,(CK, CKMB & TROPONIN) Collection Time: 12/08/19  1:30 AM  
Result Value Ref Range  26 - 192 U/L  
 CK - MB 4.5 (H) <3.6 ng/ml CK-MB Index 3.4 0.0 - 4.0 % Troponin-I, QT 0.56 (H) 0.0 - 0.045 NG/ML  
GLUCOSE, POC Collection Time: 12/08/19  1:32 AM  
Result Value Ref Range Glucose (POC) 158 (H) 70 - 110 mg/dL POC LACTIC ACID Collection Time: 12/08/19  1:35 AM  
Result Value Ref Range Lactic Acid (POC) 2.94 (HH) 0.40 - 2.00 mmol/L  
CBC WITH AUTOMATED DIFF Collection Time: 12/08/19  5:49 AM  
Result Value Ref Range WBC 19.9 (H) 4.6 - 13.2 K/uL  
 RBC 3.77 (L) 4.20 - 5.30 M/uL  
 HGB 11.0 (L) 12.0 - 16.0 g/dL HCT 32.9 (L) 35.0 - 45.0 % MCV 87.3 74.0 - 97.0 FL  
 MCH 29.2 24.0 - 34.0 PG  
 MCHC 33.4 31.0 - 37.0 g/dL  
 RDW 13.9 11.6 - 14.5 % PLATELET 832 912 - 271 K/uL MPV 9.7 9.2 - 11.8 FL  
 NEUTROPHILS 86 (H) 42 - 75 % BAND NEUTROPHILS 2 0 - 5 % LYMPHOCYTES 7 (L) 20 - 51 % MONOCYTES 5 2 - 9 % EOSINOPHILS 0 0 - 5 % BASOPHILS 0 0 - 3 %  
 ABS. NEUTROPHILS 17.5 (H) 1.8 - 8.0 K/UL  
 ABS. LYMPHOCYTES 1.4 0.8 - 3.5 K/UL  
 ABS. MONOCYTES 1.0 0 - 1.0 K/UL  
 ABS. EOSINOPHILS 0.0 0.0 - 0.4 K/UL  
 ABS. BASOPHILS 0.0 0.0 - 0.06 K/UL  
 DF MANUAL PLATELET COMMENTS ADEQUATE PLATELETS    
 RBC COMMENTS ANISOCYTOSIS 2+ 
    
 RBC COMMENTS POLYCHROMASIA 2+ METABOLIC PANEL, BASIC Collection Time: 12/08/19  5:49 AM  
Result Value Ref Range Sodium 137 136 - 145 mmol/L Potassium 4.8 3.5 - 5.5 mmol/L Chloride 108 100 - 111 mmol/L  
 CO2 19 (L) 21 - 32 mmol/L Anion gap 10 3.0 - 18 mmol/L Glucose 151 (H) 74 - 99 mg/dL BUN 31 (H) 7.0 - 18 MG/DL Creatinine 1.25 0.6 - 1.3 MG/DL  
 BUN/Creatinine ratio 25 (H) 12 - 20 GFR est AA 53 (L) >60 ml/min/1.73m2 GFR est non-AA 44 (L) >60 ml/min/1.73m2 Calcium 7.9 (L) 8.5 - 10.1 MG/DL  
PTT Collection Time: 12/08/19  5:49 AM  
Result Value Ref Range aPTT >180.0 (HH) 23.0 - 36.4 SEC AMMONIA Collection Time: 12/08/19  5:49 AM  
Result Value Ref Range Ammonia 29 11 - 32 UMOL/L  
PROTHROMBIN TIME + INR Collection Time: 12/08/19  5:49 AM  
Result Value Ref Range Prothrombin time 62.4 (H) 11.5 - 15.2 sec INR 7.3 (HH) 0.8 - 1.2 GLUCOSE, POC Collection Time: 12/08/19  4:17 PM  
Result Value Ref Range Glucose (POC) 147 (H) 70 - 110 mg/dL Additional Data Reviewed:   
 
Signed By: Mitch Chiu MD   
 December 8, 2019

## 2019-12-08 NOTE — CONSULTS
[facility] - Consult Note Consultation request by Dr. Cammy Brooks for advice/opinion related to evaluating indeterminate troponin Date of  Admission: 12/7/2019 12:17 PM  
Primary Care Physician:  None The patient was seen, examined, and independently evaluated and I agree with the below assessment and plan by Yoselin Mesa PA-C with the following comments. This lady is difficult to assess due to mental status change, but suspect her intermediate troponin's are related to supply demand mismatch though EKG yesterday abnormal and concerning for acute lateral MI. Will repeat EKG. Not a candidate for any intervention in any case due to multiple co-morbidities and ongoing mental status abnormalities. CT abnormal with right parietal infarct with developing encephalomalacia and localized sulcal effacement is likely at least subacute-chronic with also evidence of small subtle infarct at the left parietal lobe, more age indeterminate. Will review echocardiogram when available and follow acutely with you. Will also watch on telemetry for possible PAF since with frequent PAC's on telemetry. Would not get BNP since it will be difficult to interpret in view of significant co-morbidities including massive left pleural effusion together with multiple pulmonary emboli and lung cancer not to mention component of sepsis which may also raise the level. Assessment:  
 
-Presented due to altered mental status 
-Recent admission at [facility] for multifactorial dyspnea associated with right lung adenocarcinoma, multifocal PE, RV dysfunction. Chest CTA 11/11/2019 with multiple pulmonary emboli with largest clot burden in RLL representing a moderate overall clot burden. There is right perihilar soft tissue fullness with endobronchial opacification of a posterior apical bronchus in the right upper lobe. S/p thoracentesis 11/19/19 positive for malignancy. Due to start chemo next week. -CXR 12/7/19: Recurrent right pleural effusion. Decreased aeration in the left lung compatible with passive atelectasis. 
-Indeterminate troponin, 0.29 --> 0.47 --> 0.56, in setting of above 
-History of pericardial effusion, s/p pericardial window 11/12/19 
-History of paroxysmal atrial fibrillation, post procedure pericardial window, on PO Amiodarone 
-Echo 07/1/69: Normal systolic function with EF 55%, abnormal diastolic function, mildly dilated RA, normal RV size with severely reduced systolic function, trace tricuspid regurgitation, mild aortic insufficiency, mild pulmonary hypertension of 41 mmHg, small circumferential pericardial effusion present with fibrinous material, no excessive respiratory variation present 
-DMII Plan:  
 
-Will stop Heparin infusion due to INR 7.3 on this AM's labs. Would monitor INR closely and defer re-initiation of anticoagulation for PE to primary team.  Do not suspect primary cardiac event; suspect demand ischemia. -Echocardiogram ordered by primary team, pending. 
-Would consider pulmonology and hematology/oncology consultation. 
-Further recommendations to follow based on test results, hospital course. History of Present Illness: This is a 62 y.o. female admitted for Altered mental status [R41.82]. Patient complains of: Altered mental status Lay Quezada is a 62 y.o. female with PMHx as described above, who presented to the hospital due to altered mental status. According to ER note, pt's friend reported that when pt got home from the hospital yesterday, sometime after 13:00 on 12/6 she was not responding to them normally. She apparently called for one of the sons but was otherwise not interacting with them. Yesterday morning the sons found the patient lying on the floor and not responding normally, thus medics were called. RN at bedside reports that pt has been confused, pulling at BiPAP mask.   Pt is currently unable to provide any additional history due to mental status. There are no visitors at bedside at time of my evaluation. Cardiac risk factors: diabetes mellitus Review of Symptoms:   
 
Review of systems not obtained due to patient factors. Past Medical History:  
 
Past Medical History:  
Diagnosis Date  Aden[facility] lung [facility] [address]  [facility]  DM (diabetes mellitus), type 2 ([facility]  Left leg DVT ([facility]  Pericardial effusion  Pleural effusion  Pulmonary emboli ([facility] Social History:  
 
Social History Patient does not qualify to have social determinant information on file (likely too young). Socioeconomic History  Marital status:  Spouse name: Not on file  Number of children: Not on file  Years of education: Not on file  Highest education level: Not on file Family History: No family history on file. Medications:  
No Known Allergies Current Facility-Administered Medications Medication Dose Route Frequency  0.9% sodium chloride infusion  125 mL/hr IntraVENous CONTINUOUS  
 sodium chloride (NS) flush 5-10 mL  5-10 mL IntraVENous PRN  
 ondansetron (ZOFRAN) injection 4 mg  4 mg IntraVENous Q4H PRN  
 heparin 25,000 units in D5W 250 ml infusion  18-36 Units/kg/hr IntraVENous TITRATE  montelukast (SINGULAIR) tablet 10 mg  10 mg Oral QHS  insulin lispro (HUMALOG) injection   SubCUTAneous Q6H  
 glucose chewable tablet 16 g  4 Tab Oral PRN  
 glucagon (GLUCAGEN) injection 1 mg  1 mg IntraMUSCular PRN  
 dextrose (D50W) injection syrg 12.5-25 g  25-50 mL IntraVENous PRN  
 lactulose (CHRONULAC) 10 gram/15 mL 300 mL in sterile water irrigation 700 mL rectal enema  1,000 mL Rectal ONCE  piperacillin-tazobactam (ZOSYN) 3.375 g in 0.9% sodium chloride (MBP/ADV) 100 mL MBP  3.375 g IntraVENous Q6H  
 VANCOMYCIN INFORMATION NOTE   Other CONTINUOUS  
  Vancomycin random level due 12/8/19 at 1100  1 Each Other ONCE Current Outpatient Medications Medication Sig  
 amiodarone (CORDARONE) 200 mg tablet Take 200 mg by mouth daily.  apixaban (ELIQUIS) 5 mg tablet Take 5 mg by mouth.  furosemide (LASIX) 40 mg tablet Take 40 mg by mouth.  metoprolol succinate (TOPROL-XL) 50 mg XL tablet Take 50 mg by mouth.  montelukast (SINGULAIR) 10 mg tablet Take 10 mg by mouth.  benzonatate (TESSALON) 100 mg capsule Take 1 capsule 3 times daily as needed for coughing, swallow capsules whole.  SITagliptin (JANUVIA) 50 mg tablet Take 50 mg by mouth.  fluticasone propionate (FLONASE) 50 mcg/actuation nasal spray 1 Spray. Physical Exam:  
 
Visit Vitals /80 Pulse 92 Temp 98 °F (36.7 °C) Resp 23 Wt 220 lb (99.8 kg) SpO2 100% Breastfeeding No  
 
BP Readings from Last 3 Encounters:  
12/08/19 113/80 Pulse Readings from Last 3 Encounters:  
12/08/19 92 Wt Readings from Last 3 Encounters:  
12/07/19 220 lb (99.8 kg) General:  Alert but appears confused, giving minimal response to questions, no apparent distress, appears stated age Neck:  supple Lungs:  clear to auscultation bilaterally to anterolateral lung fields, but with decrease breath sounds in left lung posteriorly Heart:  Regular rate and rhythm Abdomen:  abdomen is soft without significant tenderness, masses, organomegaly or guarding Extremities:  Atraumatic, no edema Skin: Warm and dry. Neuro: alert but appears confused, no involuntary movements Psych: unable to fully assess due to condition Data Review:  
 
Recent Labs 12/08/19 
0549 12/07/19 
1241 WBC 19.9* 18.7* HGB 11.0* 12.4 HCT 32.9* 36.6  326 Recent Labs 12/08/19 
0549 12/07/19 
1241  132* K 4.8 4.7  99* CO2 19* 21 * 265* BUN 31* 34* CREA 1.25 1.80* CA 7.9* 8.7 MG  --  2.5 ALB  --  2.3*  
SGOT  --  32 ALT  --  34  
 INR 7.3*  -- No results found for this or any previous visit. All Cardiac Markers in the last 24 hours:   
Lab Results Component Value Date/Time  12/08/2019 01:30 AM  
 CPK 72 12/07/2019 07:28 PM  
 CPK 36 12/07/2019 12:41 PM  
 CKMB 4.5 (H) 12/08/2019 01:30 AM  
 CKMB 3.5 12/07/2019 07:28 PM  
 CKMB 2.6 12/07/2019 12:41 PM  
 CKND1 3.4 12/08/2019 01:30 AM  
 CKND1 4.9 (H) 12/07/2019 07:28 PM  
 CKND1 7.2 (H) 12/07/2019 12:41 PM  
 TROIQ 0.56 (H) 12/08/2019 01:30 AM  
 TROIQ 0.47 (H) 12/07/2019 07:28 PM  
 TROIQ 0.29 (H) 12/07/2019 12:41 PM  
 
 
 
Signed By: Frida Bernal PA-C December 8, 2019

## 2019-12-08 NOTE — PROGRESS NOTES
SLP Note: On call SLP acknowledging new order. Will address later this date. Thank you for this referral, Juan Pablo Kendall M.S., CCC-SLP Speech-Language Pathologist

## 2019-12-08 NOTE — ED NOTES
TRANSFER - OUT REPORT: 
 
Verbal report given to MARVIN Davison(name) on Frank  being transferred to  SD(unit) for routine progression of care Report consisted of patients Situation, Background, Assessment and  
Recommendations(SBAR). Information from the following report(s) SBAR, ED Summary and MAR was reviewed with the receiving nurse. Lines:  
Peripheral IV 12/07/19 Left Antecubital (Active) Site Assessment Clean, dry, & intact 12/7/2019  1:06 PM  
Phlebitis Assessment 0 12/7/2019  1:06 PM  
Infiltration Assessment 0 12/7/2019  1:06 PM  
Dressing Status Clean, dry, & intact 12/7/2019  1:06 PM  
Dressing Type Transparent;Tape 12/7/2019  1:06 PM  
Hub Color/Line Status Pink;Patent; Flushed 12/7/2019  1:06 PM  
  
 
Opportunity for questions and clarification was provided.

## 2019-12-08 NOTE — PROGRESS NOTES
SLP Note: SLP evaluation order received and attempted; however, pt on BiPAP. Will re-attempt next date. Thank you for this referral, Dolores Grover M.S., CCC-SLP Speech-Language Pathologist

## 2019-12-09 NOTE — PROCEDURES
[facility] Pulmonary Specialist 
[facility] Procedure Note With Manuel Indication: Inadequate venous access, Need for vasopressors Risks, benefits, alternatives explained and consent obtained. Time out performed. Patient positioned in Trendelenburg. Central line Bundle: 
Full sterile barrier precautions used. 7-Step Sterility Protocol followed. (cap, mask sterile gown, sterile gloves, large sterile sheet, hand hygiene, 2% chlorhexidine for cutaneous antisepsis) 5 mL 1% Lidocaine placed at insertion site. Using ultrasound guidance, Left femoral vein cannulated x 1 attempt(s) utilizing the modified Seldinger technique. Position of guidewire confirmed in vein using ultrasound prior to dilating. Guidewire was removed. Central venous catheter was placed without difficulty. no immediate complications encountered. Good blood return on all 3 ports. Catheter secured & Biopatch applied. Sterile Tegaderm placed. Due to technical limitations, images were unable to be captured Deborah Deluca MD 
Pulmonary & Critical Care Fellow REID/Loraine Late entry for date of service 12/9/2019 I was present for and supervised the entire procedure. See fellow note for details. Nazario Arrieta MD/MPH Pulmonary, Critical Care Medicine [facility] Pulmonary Specialists

## 2019-12-09 NOTE — PROGRESS NOTES
responded to Rapid Response for  [facility] RAFAELA, who is a 62 y.o.,female, The  provided the following Interventions: 
Provided crisis spiritual care and support. Offered prayers on behalf for the patient. Chart reviewed. The following outcomes were achieved: 
 
 
Assessment: 
There are no further spiritual or Congregation issues which require intervention at this time. Plan: 
Chaplains will continue to follow and will provide spiritual care as needed.  recommends bedside caregivers page  on duty if patient or family shows signs of acute spiritual or emotional distress. [facility] 
[phone] Chronic Obstructive Pulmonary Disease (COPD) Flare-Ups: Care Instructions  Your Care Instructions    Chronic obstructive pulmonary disease (COPD) is a lung disease that makes it hard to breathe. It is caused by damage to the lungs over many years, usually from smoking. COPD is often a mix of two diseases:  · Chronic bronchitis: The airways that carry air to the lungs (bronchial tubes) get inflamed and make a lot of mucus. This can narrow or block the airways. · Emphysema: In a healthy person, the tiny air sacs in the lungs are like balloons. As you breathe in and out, they get bigger and smaller to move air through your lungs. But with emphysema, these air sacs are damaged and lose their stretch. Less air gets in and out of the lungs. Many people with COPD have attacks called flare-ups or exacerbations. This is when your usual symptoms quickly get worse and stay worse. The doctor has checked you carefully. But problems can develop later. If you notice any problems or new symptoms, get medical treatment right away. Follow-up care is a key part of your treatment and safety. Be sure to make and go to all appointments, and call your doctor if you are having problems. It's also a good idea to know your test results and keep a list of the medicines you take. How can you care for yourself at home? · Be safe with medicines. Take your medicines exactly as prescribed. Call your doctor if you think you are having a problem with your medicine. You may be taking medicines such as:  ¨ Bronchodilators. These help open your airways and make breathing easier. ¨ Corticosteroids. These reduce airway inflammation. They may be given as pills, in a vein, or in an inhaled form. You may go home with pills in addition to an inhaler that you already use. · A spacer may help you get more inhaled medicine to your lungs. Ask your doctor or pharmacist if a spacer is right for you. If it is, ask how to use it properly.   · If your doctor prescribed antibiotics, take them as directed. Do not stop taking them just because you feel better. You need to take the full course of antibiotics. · If your doctor prescribed oxygen, use the flow rate your doctor has recommended. Do not change it without talking to your doctor first.  · Do not smoke. Smoking makes COPD worse. If you need help quitting, talk to your doctor about stop-smoking programs and medicines. These can increase your chances of quitting for good. When should you call for help? Call 911 anytime you think you may need emergency care. For example, call if:  · You have severe trouble breathing. Call your doctor now or seek immediate medical care if:  · You have new or worse trouble breathing. · Your coughing or wheezing gets worse. · You cough up dark brown or bloody mucus (sputum). · You have a new or higher fever. Watch closely for changes in your health, and be sure to contact your doctor if:  · You notice more mucus or a change in the color of your mucus. · You need to use your antibiotic or steroid pills. · You do not get better as expected. Where can you learn more? Go to http://bennett-sangita.info/. Enter P649 in the search box to learn more about \"Chronic Obstructive Pulmonary Disease (COPD) Flare-Ups: Care Instructions. \"  Current as of: July 21, 2016  Content Version: 11.1  © 0293-2393 [employer]. Care instructions adapted under license by [employer] (which disclaims liability or warranty for this information). If you have questions about a medical condition or this instruction, always ask your healthcare professional. [place] [address] any warranty or liability for your use of this information.

## 2019-12-09 NOTE — PROGRESS NOTES
Cardiovascular Specialists - Progress Note Admit Date: 12/7/2019 Assessment:  
 
-Presented due to altered mental status 
-Recent admission at [facility] for multifactorial dyspnea associated with right lung adenocarcinoma, multifocal PE, RV dysfunction. Chest CTA 11/11/2019 with multiple pulmonary emboli with largest clot burden in RLL representing a moderate overall clot burden. There is right perihilar soft tissue fullness with endobronchial opacification of a posterior apical bronchus in the right upper lobe. S/p thoracentesis 11/19/19 positive for malignancy. Due to start chemo next week. -CXR 12/7/19: Recurrent right pleural effusion. Decreased aeration in the left lung compatible with passive atelectasis. 
-Indeterminate troponin, 0.29 --> 0.47 --> 0.56, in setting of above 
-History of pericardial effusion, s/p pericardial window 11/12/19 
-History of paroxysmal atrial fibrillation, post procedure pericardial window, on PO Amiodarone 
-Echo 12/9/19 with normal EF, apical hypokinesis. -DMII Plan:  
 
Continue supportive care. Poor prognosis long term, will follow with you. Start amiodarone drip if tachycardia remains sustained. Subjective: No new complaints. Objective:  
  
Patient Vitals for the past 8 hrs: 
 Temp Pulse Resp BP SpO2  
12/09/19 1248    102/70   
12/09/19 1225     (P) 96 % 12/09/19 1200 98.9 °F (37.2 °C) (!) 107 27 117/73 96 % 12/09/19 0800 98.5 °F (36.9 °C) (!) 114 (!) 35 129/81 98 % 12/09/19 0703  (!) 110 26 115/78 100 % 12/09/19 0700  (!) 109 (!) 38 (!) 77/50 99 % Patient Vitals for the past 96 hrs: 
 Weight 12/09/19 1248 88.5 kg (195 lb) 12/09/19 0831 88.5 kg (195 lb 1.7 oz) 12/09/19 0000 88.5 kg (195 lb 1.7 oz) 12/07/19 1721 99.8 kg (220 lb) Intake/Output Summary (Last 24 hours) at 12/9/2019 1424 Last data filed at 12/9/2019 1200 Gross per 24 hour Intake 890.83 ml Output 500 ml Net 390.83 ml Physical Exam: 
General:  sleepy Neck:  nontender Lungs:  decreased Heart:  regular rate and rhythm, S1, S2 normal, no murmur, click, rub or gallop Abdomen:  abdomen is soft without significant tenderness, masses, organomegaly or guarding Extremities:  extremities normal, atraumatic, no cyanosis or edema Data Review:  
 
Labs: Results:  
   
Chemistry Recent Labs 12/09/19 
0308 12/08/19 
1900 12/08/19 
0549 12/07/19 
1241 * 184* 151* 265*  141 137 132* K 4.4 4.3 4.8 4.7  111 108 99* CO2 19* 19* 19* 21 BUN 32* 32* 31* 34* CREA 1.17 1.21 1.25 1.80* CA 8.2* 8.1* 7.9* 8.7 MG 2.6  --   --  2.5 AGAP 10 11 10 12 BUCR 27* 26* 25* 19  
AP  --   --   --  170* TP  --   --   --  7.9 ALB  --   --   --  2.3*  
GLOB  --   --   --  5.6* AGRAT  --   --   --  0.4* CBC w/Diff Recent Labs 12/09/19 
0308 12/08/19 
1900 12/08/19 
8278 WBC 20.5* 22.3* 19.9*  
RBC 3.78* 3.89* 3.77* HGB 10.9* 11.3* 11.0*  
HCT 33.5* 34.5* 32.9*  
 280 281 GRANS 88* 89* 86* LYMPH 4* 4* 7* EOS 0 0 0 Cardiac Enzymes Lab Results Component Value Date/Time CPK 59 12/08/2019 07:00 PM  
 CKMB 3.4 12/08/2019 07:00 PM  
 CKND1 5.8 (H) 12/08/2019 07:00 PM  
 TROIQ 0.71 (H) 12/09/2019 03:08 AM  
 TROIQ 0.71 (H) 12/08/2019 07:00 PM  
  
Coagulation Recent Labs 12/09/19 
1765 12/09/19 
0308 PTP 41.9* 41.8* INR 4.4* 4.4* APTT 31.3 33.1 Lipid Panel No results found for: CHOL, CHOLPOCT, CHOLX, CHLST, CHOLV, 094900, HDL, HDLP, LDL, LDLC, DLDLP, 432674, VLDLC, VLDL, TGLX, TRIGL, TRIGP, TGLPOCT, CHHD, CHHDX  
BNP No results found for: BNP, BNPP, XBNPT Liver Enzymes Recent Labs 12/07/19 
1241 TP 7.9 ALB 2.3* * SGOT 32 Digoxin Thyroid Studies No results found for: T4, T3U, TSH, TSHEXT Signed By: Talib Farias MD   
 December 9, 2019

## 2019-12-09 NOTE — PROCEDURES
[facility] Specialists Pulmonary, Critical Care, and Sleep Medicine Name: Marlene Deras MRN: [id number] : 1960 Hospital: [address] [place] [address] Date: 2019 Intubation Note Procedure: elective/emergent intubation Indication: respiratory insufficiency Anesthesia- Rapid sequence:   150mg of Ketamine,  
Paralysis: Rocuronium 100 mg(1mg/kg) After assessing the airway, the patient underwent preoxygenation with 100% FiO2 for 5 min. RSI was given sequentially in rapid IV push. The Sellick maneuver was performed throughout the entire sequence. After adequate sedation and paralisys emergent intubation was performed. A 3 C-Mac blade  laryngoscope was used to visualize the epiglottis and vocal chords. After positive identification of the vocal cords, a bougie was passed and a 7.5 ET tube was placed into the trachea with direct visualization. It was left at 23cm at the teeth The tube was seen passing through the vocal chords without  difficulty. CO2 colorimetry was employed immediately to verify tube in airway with  appropriate color change indicating detection/lack of CO2. Water vapor was seen within the ET tube, and auscultation of the abdomen revealed no bubbling souds. Auscultation  and inspection of the chest after intubation showed symmetric chest excursion and symmetric air entry bilaterally. The patient has been placed on a mechanical ventilator. There were no complications. Isela Soni MD 
 
 
Late entry for date of service 2019 I was present for and supervised the entire procedure. See fellow note for details. Shaneka Lopez MD/MPH Pulmonary, Critical Care Medicine [facility] Pulmonary Specialists

## 2019-12-09 NOTE — PROCEDURES
[facility] Pulmonary Specialists Pulmonary, Critical Care, and Sleep Medicine Name: Stephanie Funes MRN: [id number] : 1960 Hospital: [address] [place] [address] Date: 2019 Bronchoscopy Report Procedure: Diagnostic bronchoscopy. Indication: Mucus Plugging, Cancer Consent/Treatment: Emergent procedure Anesthesia:  
Patient on ventilator and receiving  Ketamine Procedure Details:  
-- The bronchoscope was introduced through an endotracheal tube. -- The vocal cords were found to be normal. 
-- The trachea and dany were completely inspected and were found to be normal. 
-- The right-sided endobronchial anatomy revealed collapse of the RUL bronchi. RLL/RML appeared to be normal. There were thin secretions which were suctioned. -- The left-sided endobronchial anatomy was inspected. The LLL appeared to be collapsed from extrinsic compression and there were thin secretions present as well. JOSE CARLOS appeared to be normal. The bronchoscope was then wedged in the LLL and a BAL sample was obtained Specimens: The bronchoscope was wedged in the LLL and bronchoalveolar lavage was performed; material was sent for  microbiology, cytology, AFB smear and culture, fungal culture and flow cytometry Complications: none Estimated Blood Loss: Minimal 
 
Abbey Klein MD 
2019 
2:46 PM 
 
 
Late entry for date of service 2019: 
 
I was present for and supervised the entire procedure. See fellow note for details. Bronchoscopy at bedside showed right upper lobe endobronchial lesion, with patency of all airways, left lower lobe which showed extrinsic compression resulting in mild mucous plugging, with secretions easily aspirated. No endobronchial lesions were counted on left side. BAL was performed in the LLL Venus Chu MD/MPH Pulmonary, Critical Care Medicine [facility] Pulmonary Specialists

## 2019-12-09 NOTE — PROGRESS NOTES
Pulm/CC follow up: 
 
Patient remains obtunded. Intermittent more frequent episodes of oxygen desaturations, tachycardia and Tachypnea. Concern for Chyne rene respiration ABG obtained- 4.47/27/73 on vapotherm. INR high. Discussed with son - David Memory over phone and sister at bedside and explained Current condition, next steps including intubation if wish to persue aggressive support vs palliation/comfort measures. Son wants patient to be full code and do the needful. Will transfer to ICU level of care. Informed and discussed with ICU team. 
 
 
 
Critical Care Time: The services I provided to this patient were to treat and/or prevent clinically significant deterioration that could result in the failure of one or more body systems and/or organ systems due to respiratory distress, hypoxia, cardiac dysrhythmia. 
  
Services included the following: 
-reviewing nursing notes and old charts 
-vital sign assessments 
-direct patient care 
-medication orders and management 
-interpreting and reviewing diagnostic studies/labs 
-re-evaluations 
-documentation time 
  
Aggregate critical care time was 40  minutes, which includes only time during which I was engaged in work directly related to the patient's care as described above. During this entire length of time I was immediately available to the patient. The reason for providing this level of medical care for this critically ill patient was due a critical illness that impaired one or more vital organ systems such that there was a high probability of imminent or life threatening deterioration in the patients condition. This care involved high complexity decision making to assess, manipulate, and support vital system functions, to treat this degreee vital organ system failure and to prevent further life threatening deterioration of the patients condition Jody James MD

## 2019-12-09 NOTE — PROGRESS NOTES
Fio2 increased on High Flow to 75. [facility] Patient remain SOB. Pl;aced on vapo therm 60%/40lpm.  Appears a bit more comfortable. Pt at high risk for intubation. Will continue monitor.

## 2019-12-09 NOTE — CDMP QUERY
Patient admitted with recent Dx Lung CA, diagnosis of pleural effusion Please clarify if this patient has: Malignant Pleural Effusion Pleural Effusion not related to Lung CA Other Explanation of clinical findings Clinically Undetermined (no explanation for clinical findings) The medical record reflects the following: 
   Risk Factors: Recent Dx Dx Lung CA Clinical Indicators:  
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 malignant pleural effusion s/p thoracentesis 11/19 12/9 Onc:  She subsequently had a left pleural effusion drained. Cells were suspicious for malignancy. [facility] records: 11/21  Left Pleural Fluid:  Rare mildly atypical cells, suspicious for adenocarcinoma, see NOTE. Treatment: possible thoracentesis depending on family's decision Thank you. Nahomy Couch RN BSN CCDS [phone]

## 2019-12-09 NOTE — CDMP QUERY
This patient has been diagnosed with Sepsis. Please document in the progress notes the Clinical Indicators and any associated Acute Organ Dysfunction that supports this diagnosis or state that the diagnosis has been ruled out. =>Sepsis ruled out, SIRS due to Non-infectious processes with associated acute organ dysfunction of (please clarify) =>Sepsis ruled in with associated acute organ dysfunction(s) of (please clarify) Current documentation:  
12/8 PN: Sepsis, Lactic acidosis 12/9 ID consult:  Highly complex clinical picture. Clinical presentation seems c/w acute hypoxic respiratory failure due to recurrent pleural effusion, atelectasis due to metastatic adenocarcinoma of lung. Can't exclude post obstructive pneumonia with full certainty. Sepsis  (BSV Approved definition) Documented Source of suspected or confirmed infection as manifested by 2 or more of the following, not easily explained by another co-existing condition: 
Temperature:  >38C (100.9F)   -OR-  <36C  (96.8F) Heart Rate:  > 90 bpm 
Respiratory Rate:  > 20 / min  -OR-  PaCO2 < 32 WBC:  > 12K  -OR- < 4K  -OR- Bands > 10 Explicitly link all related/associated Acute Organ Dysfunction to Sepsis:     
Encephalopathy Sepsis-induced Hypotension (or)  Septic Shock [SBP < 90 (or) MAP <65 (or) SBP drop > 40 points from baseline] Hypoxemia (or)  Acute Respiratory Failure [need for invasive or non-invasive ventilation OR- pO2 < 60 mmHg] Acute Kidney Injury (or) Acute Renal Failure OR- Oliguria [serum Creatinine > 2.0 OR- Urine Output < 0.5ml/kg/hr for 2 hours] Ileus (absent bowel sounds) Coagulopathy  DIC  Thrombocytopenia [Platelet Count < 546,452 OR- INR > 1.5 OR- aPTT >60 sec] Hyperbilirubinemia     [Bilirubin > 2 mg/dL] Hyperlactatemia   [Lactic Acid > 2.0] Critical-Illness Myopathy or Polyneuropathy Thank you. Stacey Glez RN BSN CCDS [phone]

## 2019-12-09 NOTE — CONSULTS
Infectious Disease Consultation Note Requested by: dr. Shawn Singer 
 
Reason:  
 
Current abx Prior abx Pip/tazo, vancomycin since 12/7 
azithromycin since 12/8 Lines:  
 
 
Assessment : 
 
62 y.o. female  with h/o type 2 DM (last hgbA1C 7.5 on 12/7/19), CHF, recently diagnosed lung adenocarcinoma presented to ed on 12/7/19 with altered mental status.  
 
CXR 12/9- total opacification of the left chest with some shift of the mediastinal contents into the right chest.  
 
Bedside USG revealed moderate left pleural effusion, questionable atelectasis. Highly complex clinical picture. Clinical presentation seems c/w acute hypoxic respiratory failure due to recurrent pleural effusion, atelectasis due to metastatic adenocarcinoma of lung. Can't exclude post obstructive pneumonia with full certainty. D/w pulmonary. Plans to perform therapeutic thoracentesis/bronchoscopy if family wishes aggressive management. Low clinical probability of community acquired pneumonia, hcap. Hence, will de escalate current abx. Recommendations: 
 
1. D/c vancomycin. Azithromycin. Continue pip/tazo for now 2. Await decisions about thoracentesis/bronch. Will de escalate/discontinue abx based on these findings 3. Agree with palliative care discussions. 4. F/u blood cultures, clinically Thank you for consultation request. Above plan was discussed in details with dr. Shawn Singer and dr Varun Heck. Please call me if any further questions or concerns. Will continue to participate in the care of this patient. HPI: 
 
62 y.o. female  with h/o type 2 DM (last hgbA1C 7.5 on 12/7/19), CHF, recently diagnosed lung adenocarcinoma presented to ed on 12/7/19 with altered mental status.  
Patient had 2 recent admissions at [address] [facility]. Was hospitalized 11/11-11/30/19. Was diagnosed to have right lung adenocarcinoma with mets to pleura and pericardium.  S/p pericardial window 11/12, malignant pleural effusion s/p thoracentesis 11/19. Also noted to have acute PE  With multiple pulmonary emboli. She was readmitted 12/4-12/6 with cough,  was discharged. Patient continued to have sob. On 12/7, family noted her to be altered, refusing to get off the floor ultimately resulted in presentation to the ED. [facility] presentation to ED patient had been restless and confused.  Patient poorly responding to questions other than stating at times that she is short of breath.  she was noted to be hypoxic and was placed on BiPAP which subsequently has been transitioned to high flow nasal cannula. Patient was evaluated by cardiology and nephrology. Pulmonology consulted. Bedside USG revealed moderate left pleural effusion, questionable atelectasis. Plans for d/w family about goals of care noted. Patient is very lethargic. Non verbal. Detailed ros not feasible. I have reviewed all of the available data including the patient's previous history external records and radiological imaging available for review. Past Medical History:  
Diagnosis Date  Adenocarcinoma of lung ([address]  CHF (congestive heart failure) ([address]  DM (diabetes mellitus), type 2 ([address]  Left leg DVT ([address]  Pericardial effusion  Pleural effusion  Pulmonary emboli ([address] No past surgical history on file. 
 
home Medication List  
 Details  
amiodarone (CORDARONE) 200 mg tablet Take 200 mg by mouth daily. apixaban (ELIQUIS) 5 mg tablet Take 5 mg by mouth. furosemide (LASIX) 40 mg tablet Take 40 mg by mouth.  
  
metoprolol succinate (TOPROL-XL) 50 mg XL tablet Take 50 mg by mouth.  
  
montelukast (SINGULAIR) 10 mg tablet Take 10 mg by mouth.  
  
benzonatate (TESSALON) 100 mg capsule Take 1 capsule 3 times daily as needed for coughing, swallow capsules whole. SITagliptin (JANUVIA) 50 mg tablet Take 50 mg by mouth. fluticasone propionate (FLONASE) 50 mcg/actuation nasal spray 1 Spray. Current Facility-Administered Medications Medication Dose Route Frequency  influenza vaccine 2019-20 (6 mos+)(PF) (FLUARIX/FLULAVAL/FLUZONE QUAD) injection 0.5 mL  0.5 mL IntraMUSCular PRIOR TO DISCHARGE  vancomycin (VANCOCIN) 1250 mg in  ml infusion  1,250 mg IntraVENous Q18H  
 azithromycin (ZITHROMAX) 500 mg in  mL  500 mg IntraVENous Q24H  
 0.9% sodium chloride infusion  25 mL/hr IntraVENous CONTINUOUS  
 sodium chloride (NS) flush 5-10 mL  5-10 mL IntraVENous PRN  
 ondansetron (ZOFRAN) injection 4 mg  4 mg IntraVENous Q4H PRN  
 insulin lispro (HUMALOG) injection   SubCUTAneous Q6H  
 glucose chewable tablet 16 g  4 Tab Oral PRN  
 glucagon (GLUCAGEN) injection 1 mg  1 mg IntraMUSCular PRN  
 dextrose (D50W) injection syrg 12.5-25 g  25-50 mL IntraVENous PRN  piperacillin-tazobactam (ZOSYN) 3.375 g in 0.9% sodium chloride (MBP/ADV) 100 mL MBP  3.375 g IntraVENous Q6H Allergies: Patient has no known allergies. No family history on file. Social History Socioeconomic History  Marital status:  Spouse name: Not on file  Number of children: Not on file  Years of education: Not on file  Highest education level: Not on file Occupational History  Not on file Social Needs  Financial resource strain: Not on file  Food insecurity:  
  Worry: Not on file Inability: Not on file  Transportation needs:  
  Medical: Not on file Non-medical: Not on file Tobacco Use  Smoking status: Not on file Substance and Sexual Activity  Alcohol use: Not on file  Drug use: Not on file  Sexual activity: Not on file Lifestyle  Physical activity:  
  Days per week: Not on file Minutes per session: Not on file  Stress: Not on file Relationships  Social connections:  
  Talks on phone: Not on file Gets together: Not on file Attends [employer] service: Not on file Active member of club or organization: Not on file Attends meetings of clubs or organizations: Not on file Relationship status: Not on file  Intimate partner violence:  
  Fear of current or ex partner: Not on file Emotionally abused: Not on file Physically abused: Not on file Forced sexual activity: Not on file Other Topics Concern  Not on file Social History Narrative  Not on file Social History Tobacco Use Smoking Status Not on file Temp (24hrs), Av.3 °F (36.8 °C), Min:97.1 °F (36.2 °C), Max:98.9 °F (37.2 °C) Visit Vitals /81 (BP 1 Location: Left arm, BP Patient Position: At rest) Pulse (!) 114 Temp 98.5 °F (36.9 °C) Comment: Simultaneous filing. User may not have seen previous data. Resp (!) 35 Ht 5' 6\" (1.676 m) Wt 88.5 kg (195 lb 1.7 oz) SpO2 98% Breastfeeding No  
BMI 31.49 kg/m² ROS: unable to obtain Physical Exam: 
 
General:   Lethargic and not able to communicate effectively Head:  Normocephalic, without obvious abnormality, atraumatic. Eyes:  Conjunctivae/corneas clear. PERRL, EOMs intact. Nose: Nares normal. Septum midline. Mucosa normal. No drainage or sinus tenderness. Throat: Lips, mucosa, and tongue normal. Teeth and gums normal.  Drooling and not able to clear secretions Neck: Supple, symmetrical, trachea midline, no adenopathy, thyroid: no enlargment/tenderness/nodules, no carotid bruit and no JVD.  
     
Lungs:    Absent breath sounds on left side, no rhonchi/wheezing on right Chest wall:  No tenderness or deformity. Mediport on right Heart:  Regular rate and rhythm, S1, S2 normal, no click, rub or gallop. Abdomen:   Soft, non-tender. Bowel sounds normal. No masses,  No organomegaly. Extremities: Extremities normal, atraumatic, no cyanosis or edema. Pulses: 2+ and symmetric all extremities. Skin: Skin color, texture, turgor normal. No rashes or lesions      
 Neurologic: Grossly nonfocal but not able to assess adequately due to mentation  
  
 
 
Labs: Results:  
Chemistry Recent Labs 12/09/19 
0308 12/08/19 
1900 12/08/19 
0549 12/07/19 
1241 * 184* 151* 265*  141 137 132* K 4.4 4.3 4.8 4.7  111 108 99* CO2 19* 19* 19* 21 BUN 32* 32* 31* 34* CREA 1.17 1.21 1.25 1.80* CA 8.2* 8.1* 7.9* 8.7 AGAP 10 11 10 12 BUCR 27* 26* 25* 19  
AP  --   --   --  170* TP  --   --   --  7.9 ALB  --   --   --  2.3*  
GLOB  --   --   --  5.6* AGRAT  --   --   --  0.4* CBC w/Diff Recent Labs 12/09/19 
0308 12/08/19 
1900 12/08/19 
3469 WBC 20.5* 22.3* 19.9*  
RBC 3.78* 3.89* 3.77* HGB 10.9* 11.3* 11.0*  
HCT 33.5* 34.5* 32.9*  
 280 281 GRANS 88* 89* 86* LYMPH 4* 4* 7* EOS 0 0 0 Microbiology Recent Labs 12/07/19 
1749 12/07/19 
1630 12/07/19 
1550 CULT NO GROWTH 2 DAYS NO GROWTH 2 DAYS NO GROWTH 2 DAYS  
  
 
 
RADIOLOGY: 
 
All available imaging studies/reports in [facility] for this admission were reviewed Dr. Chris Hernandez, Infectious Disease Specialist 
[phone] December 9, 2019 
11:30 AM

## 2019-12-09 NOTE — PROGRESS NOTES
Speech Therapy: 
 
Evaluation orders received. Upon completion of chart review and discussion with RN, pt not appropriate for SLP evaluation this date. Currently, patient is: 
 
[] Tolerating current po diet (per RN report) 
[] Tolerating current po diet; however, poor po intake (per RN report) [] Receiving nutrition via tube feeding  
[x] Lethargic, solomnent, or difficult to arouse for safe po trials [] Unable to manage secretions 
[] Receiving intervention for respiratory distress 
[] < 6 hours s/p extubation  
[] Other:  
 
Please contact SLP with questions/concerns. SLP will follow up next day. Thank you for this referral. 
 
Stevenson Fitch M.S. CCC-SLP/L Speech-Language Pathologist

## 2019-12-09 NOTE — ROUTINE PROCESS
0730: Assumed care of pt at this time. Assessment as charted. Pt in NAD on HFNC O2. Opens eyes to verbal stimulation but does not follow any other commands. 0900:  SLP eval deferred at this time due to AMS. 1145:  Pt increasingly tachypnic. RT switched patient from HFNC to [address] @ 40L, 60% FIO2.  
 
1400: Pt with increasing episodes of tachycardia and desaturation. Work of breathing increased, increasingly tachypnic. During prep for intubation pt became hypotensive with SBP in 60's. Give push dose yesenia and NS bolus, norepi gtt started @ 10mcg/min. Pt intubated, 7.5 ETT, 23 at the lip. Pt bronched by ICU team. Sputum sent to lab. OGT inserted. CXR, KUB obtained. Zhu inserted using sterile technique, Clau Case second RN present. UA sent. Son Ignacio Cunningham updated via phone, sisters updated at bedside. 1600:  Left fem CVL placed by Dr. Kingston Nunnelly. OGT OK to use per Dr. Monster Meza. Dr. Cassia Rouse at bedside, see note. 1915: Bedside and Verbal shift change report given to Parviz Cho (oncoming nurse) by Ernesto Treviño RN 
 (offgoing nurse). Report included the following information Procedure Summary, Intake/Output, MAR, Recent Results and Alarm Parameters .

## 2019-12-09 NOTE — PROGRESS NOTES
[facility] Hospitalist Group Progress Note Patient: Jarrell Correa Age: 62 y.o. : 1960 MR#: [id number] SSN: [id number] Date/Time: 2019 Subjective:  
Events noted, d/w Intensivist  
Patient lying in bed intubated, sister at bedside Assessment/Plan:  
 
- acute hypoxic resp failure - HCAP 
- Acute metabolic encephalopathy may be 2/2 sepsis - Sepsis with lactic acidosis, FISH and encephalopathy in the setting of HCAP 
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 
- recently Diagnosed Lung adenoca, sees Dr Clay Bowles ( [facility] ) 
- h/o PE and LLE DVT. INR is elevated - h/o recent pericardial effusion 
- DM2 
- Type 2 MI 2/2 demand ischemia - PLAN Vent support Now on pressors On abx Cardiology following Oncology input noted Monitor Coags Monitor renal function Monitor sugars, ssi Full code Prognosis is Poor Palliative care consult Case discussed with:  []Patient  []Family  []Nursing  []Case Management DVT Prophylaxis:  []Lovenox  []Hep SQ  []SCDs  []Coumadin   []On Heparin gtt Objective:  
VS:  
Visit Vitals /82 Pulse (!) 102 Temp 98.9 °F (37.2 °C) Resp 18 Ht 5' 6\" (1.676 m) Wt 88.5 kg (195 lb) SpO2 100% Breastfeeding No  
BMI 31.47 kg/m² Tmax/24hrs: Temp (24hrs), Av.6 °F (37 °C), Min:98.2 °F (36.8 °C), Max:98.9 °F (37.2 °C) Input/Output:  
 
Intake/Output Summary (Last 24 hours) at 2019 1616 Last data filed at 2019 1200 Gross per 24 hour Intake 890.83 ml Output 500 ml Net 390.83 ml General:  intubated Cardiovascular:  S1S2+, tachycardic Pulmonary:  Coarse bs b/l GI:  Soft, BS+, NT, ND Extremities:  No edema Labs:   
Recent Results (from the past 24 hour(s)) GLUCOSE, POC Collection Time: 19  4:17 PM  
Result Value Ref Range Glucose (POC) 147 (H) 70 - 110 mg/dL EKG, 12 LEAD, SUBSEQUENT Collection Time: 19  6:52 PM  
Result Value Ref Range Ventricular Rate 108 BPM  
 Atrial Rate 108 BPM  
 P-R Interval 146 ms  
 QRS Duration 88 ms Q-T Interval 346 ms  
 QTC Calculation (Bezet) 463 ms Calculated P Axis 57 degrees Calculated R Axis -31 degrees Calculated T Axis 50 degrees Diagnosis Sinus tachycardia Left axis deviation Low voltage QRS Cannot rule out Anterior infarct (cited on or before 07-DEC-2019) Abnormal ECG When compared with ECG of 07-DEC-2019 16:08, 
premature atrial complexes are no longer present Serial changes of evolving Anterior infarct present CARDIAC PANEL,(CK, CKMB & TROPONIN) Collection Time: 12/08/19  7:00 PM  
Result Value Ref Range CK 59 26 - 192 U/L  
 CK - MB 3.4 <3.6 ng/ml CK-MB Index 5.8 (H) 0.0 - 4.0 % Troponin-I, QT 0.71 (H) 0.0 - 0.045 NG/ML  
CBC WITH AUTOMATED DIFF Collection Time: 12/08/19  7:00 PM  
Result Value Ref Range WBC 22.3 (H) 4.6 - 13.2 K/uL  
 RBC 3.89 (L) 4.20 - 5.30 M/uL  
 HGB 11.3 (L) 12.0 - 16.0 g/dL HCT 34.5 (L) 35.0 - 45.0 % MCV 88.7 74.0 - 97.0 FL  
 MCH 29.0 24.0 - 34.0 PG  
 MCHC 32.8 31.0 - 37.0 g/dL  
 RDW 14.4 11.6 - 14.5 % PLATELET 077 715 - 791 K/uL MPV 10.0 9.2 - 11.8 FL  
 NEUTROPHILS 89 (H) 42 - 75 % BAND NEUTROPHILS 4 0 - 5 % LYMPHOCYTES 4 (L) 20 - 51 % MONOCYTES 3 2 - 9 % EOSINOPHILS 0 0 - 5 % BASOPHILS 0 0 - 3 %  
 ABS. NEUTROPHILS 20.7 (H) 1.8 - 8.0 K/UL  
 ABS. LYMPHOCYTES 0.9 0.8 - 3.5 K/UL  
 ABS. MONOCYTES 0.7 0 - 1.0 K/UL  
 ABS. EOSINOPHILS 0.0 0.0 - 0.4 K/UL  
 ABS. BASOPHILS 0.0 0.0 - 0.06 K/UL  
 DF MANUAL PLATELET COMMENTS ADEQUATE PLATELETS    
 RBC COMMENTS NORMOCYTIC, NORMOCHROMIC METABOLIC PANEL, BASIC Collection Time: 12/08/19  7:00 PM  
Result Value Ref Range Sodium 141 136 - 145 mmol/L Potassium 4.3 3.5 - 5.5 mmol/L Chloride 111 100 - 111 mmol/L  
 CO2 19 (L) 21 - 32 mmol/L Anion gap 11 3.0 - 18 mmol/L Glucose 184 (H) 74 - 99 mg/dL  BUN 32 (H) 7.0 - 18 MG/DL  
 Creatinine 1.21 0.6 - 1.3 MG/DL  
 BUN/Creatinine ratio 26 (H) 12 - 20 GFR est AA 55 (L) >60 ml/min/1.73m2 GFR est non-AA 45 (L) >60 ml/min/1.73m2 Calcium 8.1 (L) 8.5 - 10.1 MG/DL PROTHROMBIN TIME + INR Collection Time: 12/08/19  7:00 PM  
Result Value Ref Range Prothrombin time 40.7 (H) 11.5 - 15.2 sec INR 4.3 (H) 0.8 - 1.2 PTT Collection Time: 12/08/19  7:00 PM  
Result Value Ref Range aPTT 32.1 23.0 - 36.4 SEC  
POC G3 Collection Time: 12/08/19  7:53 PM  
Result Value Ref Range Device: High Flow Nasal Cannula Flow rate (POC) 55 L/M  
 FIO2 (POC) 100 % pH (POC) 7.410 7.35 - 7.45    
 pCO2 (POC) 28.4 (L) 35.0 - 45.0 MMHG  
 pO2 (POC) 271 (H) 80 - 100 MMHG  
 HCO3 (POC) 18.0 (L) 22 - 26 MMOL/L  
 sO2 (POC) 100 (H) 92 - 97 % Base deficit (POC) 5 mmol/L Allens test (POC) YES Total resp. rate 30 Site LEFT RADIAL Patient temp. 98.6 Specimen type (POC) ARTERIAL Performed by Kennyth Moritz GLUCOSE, POC Collection Time: 12/09/19 12:04 AM  
Result Value Ref Range Glucose (POC) 165 (H) 70 - 110 mg/dL CBC WITH AUTOMATED DIFF Collection Time: 12/09/19  3:08 AM  
Result Value Ref Range WBC 20.5 (H) 4.6 - 13.2 K/uL  
 RBC 3.78 (L) 4.20 - 5.30 M/uL  
 HGB 10.9 (L) 12.0 - 16.0 g/dL HCT 33.5 (L) 35.0 - 45.0 % MCV 88.6 74.0 - 97.0 FL  
 MCH 28.8 24.0 - 34.0 PG  
 MCHC 32.5 31.0 - 37.0 g/dL  
 RDW 14.5 11.6 - 14.5 % PLATELET 462 168 - 123 K/uL MPV 10.0 9.2 - 11.8 FL  
 NEUTROPHILS 88 (H) 42 - 75 % BAND NEUTROPHILS 5 0 - 5 % LYMPHOCYTES 4 (L) 20 - 51 % MONOCYTES 2 2 - 9 % EOSINOPHILS 0 0 - 5 % BASOPHILS 0 0 - 3 % METAMYELOCYTES 1 (H) 0 %  
 ABS. NEUTROPHILS 19.1 (H) 1.8 - 8.0 K/UL  
 ABS. LYMPHOCYTES 0.8 0.8 - 3.5 K/UL  
 ABS. MONOCYTES 0.4 0 - 1.0 K/UL  
 ABS. EOSINOPHILS 0.0 0.0 - 0.4 K/UL  
 ABS. BASOPHILS 0.0 0.0 - 0.06 K/UL  
 DF AUTOMATED PLATELET COMMENTS ADEQUATE PLATELETS RBC COMMENTS ANISOCYTOSIS 
1+ METABOLIC PANEL, BASIC Collection Time: 12/09/19  3:08 AM  
Result Value Ref Range Sodium 140 136 - 145 mmol/L Potassium 4.4 3.5 - 5.5 mmol/L Chloride 111 100 - 111 mmol/L  
 CO2 19 (L) 21 - 32 mmol/L Anion gap 10 3.0 - 18 mmol/L Glucose 189 (H) 74 - 99 mg/dL BUN 32 (H) 7.0 - 18 MG/DL Creatinine 1.17 0.6 - 1.3 MG/DL  
 BUN/Creatinine ratio 27 (H) 12 - 20 GFR est AA 57 (L) >60 ml/min/1.73m2 GFR est non-AA 47 (L) >60 ml/min/1.73m2 Calcium 8.2 (L) 8.5 - 10.1 MG/DL MAGNESIUM Collection Time: 12/09/19  3:08 AM  
Result Value Ref Range Magnesium 2.6 1.6 - 2.6 mg/dL TROPONIN I Collection Time: 12/09/19  3:08 AM  
Result Value Ref Range Troponin-I, QT 0.71 (H) 0.0 - 0.045 NG/ML  
PROTHROMBIN TIME + INR Collection Time: 12/09/19  3:08 AM  
Result Value Ref Range Prothrombin time 41.8 (H) 11.5 - 15.2 sec INR 4.4 (H) 0.8 - 1.2 PTT Collection Time: 12/09/19  3:08 AM  
Result Value Ref Range aPTT 33.1 23.0 - 36.4 SEC LACTIC ACID Collection Time: 12/09/19  3:08 AM  
Result Value Ref Range Lactic acid 3.0 (HH) 0.4 - 2.0 MMOL/L  
LD Collection Time: 12/09/19  3:08 AM  
Result Value Ref Range  (H) 81 - 234 U/L  
POC G3 Collection Time: 12/09/19  5:24 AM  
Result Value Ref Range Device: High Flow Nasal Cannula Flow rate (POC) 55 L/M  
 FIO2 (POC) 60 % pH (POC) 7.364 7.35 - 7.45    
 pCO2 (POC) 31.8 (L) 35.0 - 45.0 MMHG  
 pO2 (POC) 34 (LL) 80 - 100 MMHG  
 HCO3 (POC) 18.1 (L) 22 - 26 MMOL/L  
 sO2 (POC) 63 (L) 92 - 97 % Base deficit (POC) 7 mmol/L Allens test (POC) N/A Total resp. rate 40 Site RIGHT RADIAL Specimen type (POC) ARTERIAL Performed by David Soto GLUCOSE, POC Collection Time: 12/09/19  6:51 AM  
Result Value Ref Range Glucose (POC) 166 (H) 70 - 110 mg/dL PROTHROMBIN TIME + INR Collection Time: 12/09/19  9:27 AM  
Result Value Ref Range Prothrombin time 41.9 (H) 11.5 - 15.2 sec INR 4.4 (H) 0.8 - 1.2 PTT Collection Time: 12/09/19  9:27 AM  
Result Value Ref Range aPTT 31.3 23.0 - 36.4 SEC LACTIC ACID Collection Time: 12/09/19  9:27 AM  
Result Value Ref Range Lactic acid 3.1 (HH) 0.4 - 2.0 MMOL/L  
GLUCOSE, POC Collection Time: 12/09/19 11:39 AM  
Result Value Ref Range Glucose (POC) 133 (H) 70 - 110 mg/dL GLUCOSE, POC Collection Time: 12/09/19 12:10 PM  
Result Value Ref Range Glucose (POC) 144 (H) 70 - 110 mg/dL ECHO ADULT FOLLOW-UP OR LIMITED Collection Time: 12/09/19  1:14 PM  
Result Value Ref Range LVIDd 2.74 (A) 3.9 - 5.3 cm  
 LVPWd 1.20 (A) 0.6 - 0.9 cm LVIDs 2.38 cm IVSd 1.17 (A) 0.6 - 0.9 cm  
 LV ED Vol A2C 39.3 mL  
 LV ES Vol A4C 28.1 mL  
 LV ES Vol BP 25.5 19 - 49 mL  
 BP EF 48.1 (A) 55 - 100 % LV Ejection Fraction MOD 4C 41 % LV Ejection Fraction MOD 2C 49 % LV Mass .2 67 - 162 g  
 LV Mass AL Index 52.7 43 - 95 g/m2 LV ES Vol A2C 20.2 mL  
 LVES Vol Index BP 12.9 mL/m2 LV ED Vol A4C 47.4 mL  
 LVED Vol Index BP 24.8 mL/m2 LV ED Vol BP 49.1 (A) 56 - 104 ml  
 LVED Vol Index A4C 24.0 mL/m2 LVED Vol Index A2C 19.9 mL/m2 LVES Vol Index A4C 14.2 mL/m2 LVES Vol Index A2C 10.2 mL/m2 Left Ventricular Fractional Shortening by 2D 39.677861966 % Left Ventricular End Diastolic Volume by Teichholz Method [id number] mL Left Ventricular End Systolic Volume by Teichholz Method 5.42029132010 mL Left Ventricular Stroke Volume by 2-D Biplane-MOD 14.498428107 mL Left Ventricular Stroke Volume by 2-D Single Plane- MOD 4.558983017 mL Left Ventricular Stroke Volume by Teichholz Method 9.5350254160 mL Left Ventricular Stroke Volume by 2-D Single Plane- MOD 4.2915276188 mL  
POC G3 Collection Time: 12/09/19  1:16 PM  
Result Value Ref Range Device: High Flow Nasal Cannula Flow rate (POC) 40 L/M  
 FIO2 (POC) 60 % pH (POC) 7.427 7.35 - 7.45    
 pCO2 (POC) 26.7 (L) 35.0 - 45.0 MMHG  
 pO2 (POC) 73 (L) 80 - 100 MMHG  
 HCO3 (POC) 17.6 (L) 22 - 26 MMOL/L  
 sO2 (POC) 95 92 - 97 % Base deficit (POC) 7 mmol/L Allens test (POC) YES Total resp. rate 36 Site RIGHT RADIAL Specimen type (POC) ARTERIAL Performed by Dorothea Brumfield URINALYSIS W/MICROSCOPIC Collection Time: 12/09/19  3:10 PM  
Result Value Ref Range Color DARK YELLOW Appearance TURBID Specific gravity 1.030 1.005 - 1.030    
 pH (UA) 5.0 5.0 - 8.0 Protein 30 (A) NEG mg/dL Glucose NEGATIVE  NEG mg/dL Ketone NEGATIVE  NEG mg/dL Bilirubin SMALL (A) NEG Blood NEGATIVE  NEG Urobilinogen 2.0 (H) 0.2 - 1.0 EU/dL Nitrites NEGATIVE  NEG Leukocyte Esterase NEGATIVE  NEG    
 WBC PENDING /hpf  
 RBC PENDING /hpf Epithelial cells PENDING /lpf Bacteria PENDING /hpf POC G3 Collection Time: 12/09/19  3:51 PM  
Result Value Ref Range Device: VENT    
 FIO2 (POC) 100 % pH (POC) 7.100 (LL) 7.35 - 7.45    
 pCO2 (POC) 71.0 (H) 35.0 - 45.0 MMHG  
 pO2 (POC) 144 (H) 80 - 100 MMHG  
 HCO3 (POC) 22.0 22 - 26 MMOL/L  
 sO2 (POC) 98 (H) 92 - 97 % Base deficit (POC) 8 mmol/L Mode ASSIST CONTROL Tidal volume 400 ml Set Rate 10 bpm  
 PEEP/CPAP (POC) 5 cmH2O Allens test (POC) YES Inspiratory Time 0.9 sec Total resp. rate 10 Site RIGHT RADIAL Specimen type (POC) ARTERIAL Performed by Dorothea Brumfield Volume control plus YES Additional Data Reviewed:   
 
Signed By: Karishma Saldaña MD   
 December 9, 2019

## 2019-12-09 NOTE — ROUTINE PROCESS
0730 Bedside and Verbal shift change report given to Krystina Castillo (oncoming nurse) by Anshul Sparks (offgoing nurse). Report included the following information SBAR, Kardex, MAR and Recent Results.

## 2019-12-09 NOTE — PROGRESS NOTES
I saw patient at the end of RRT. D/w Resident doctors. Family including sister, niece and granddaughter at bedside. Patient confused and not tolerating BIPAP ( keeps taking it off ). Now placed on HFNC and maintaining O2 sats. Still appears dyspneic. ABG reassuring. RR- 32 General:  Awake, confused Cardiovascular:  S1S2+, RRR Pulmonary:  Coarse bs b/l, tachypniec GI:  Soft, BS+, NT, ND Extremities:  No edema Continue abx, HFNC 
D/w Pulmonologist on Call Dr Jing Calero and will move patient to MICU as stepdown overflow. D/w nursing supervisor. D/w family at bedside. Also called and d/w daughter over phone [phone] D/w MARVIN

## 2019-12-09 NOTE — CONSULTS
[employer] [facility] Pulmonary, Critical Care, and Sleep Medicine Initial Patient Consult Name: Robert Martinez MRN: [id number] : 1960 Hospital: [facility] Date: 2019 IMPRESSION:  
· Acute hypoxic respiratory failure secondary to progressive pulmonary issues including left pleural effusion with compressive atelectasis of the left lung versus endobronchial obstruction from malignancy with atelectasis. 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 
· Altered mental status acute-CT of the head with Age-indeterminate small left parietal lobe infarct. MRI could be utilized to assess for acuity. Right parietal infarct appears at least late subacute. Small cerebellar infarcts, likely chronic. · Status post pericardial window for pericardial effusion · Atrial fibrillation, congestive heart failure · Iatrogenic supratherapeutic INR · Diabetes RECOMMENDATIONS:  
· Oxygen-continue supplementation to maintain saturation more than 92% · We will perform bedside ultrasound to assess left hemithorax opacification · Will need therapeutic thoracentesis/bronchoscopy to reverse atelectasis and improve oxygenation · Will discuss with next of kin goals of care and need for procedure · Patient may need replacement with FFP to reverse supratherapeutic INR for procedures · Bronchial hygiene protocol · Bronchodilators · Antibiotics-cover for postobstructive pneumonia, HAP with recent hospitalization · Aspiration precautions · Need for further diagnostics- CT scan chest 
· Strongly recommend discussion of goals of care given extent of malignancy and rapid progression with declining functional status. · Will Follow · DVT, PUD prophylaxis Subjective: This patient has been seen and evaluated at the request of Dr. Paschal Rubinstein for acute respiratory failure. Patient is a 62 y.o. female  presents with altered mental status . Patient was discharged from [facility] yesterday at which point sister felt she was talking like her normal self. Family noted her to be altered, refusing to get off the floor ultimately resulted in presentation to the ED. Since presentation to ED patient has been restless and confused. Patient poorly responding to questions other than stating at times that she is short of breath. Patient does not offer any other complaints including no CP, abd pain or nausea. She was noted to be hypoxic and was placed on BiPAP which subsequently has been transitioned to high flow nasal cannula. Patient was evaluated by cardiology and nephrology. Pulmonology consult requested for further assistance I have reviewed all of the available data including the patient's previous history external records and radiological imaging available for review. Reviewed all the records from recent hospitalization at [facility] admitted there in November and subsequently again for continued symptoms of shortness of breath diagnosed with right lung adenocarcinoma with mets to pleura and pericardium. Patient had a pericardial window and a thoracentesis done at [facility] .pleural cytology positive for adeno CA ,pericardial cytology was negative. Also found to have acute pulmonary embolism with left leg DVT-has been on Eliquis for anticoagulation. Oncology was to start chemotherapy. In addition applicable cardiology and other lab data were also reviewed. Past Medical History:  
Diagnosis Date  Adenocarcinoma of lung ([address]  CHF (congestive heart failure) ([address]  DM (diabetes mellitus), type 2 ([address]  Left leg DVT ([address]  Pericardial effusion  Pleural effusion  Pulmonary emboli ([address] No past surgical history on file. Prior to Admission medications Medication Sig Start Date End Date Taking? Authorizing Provider  
amiodarone (CORDARONE) 200 mg tablet Take 200 mg by mouth daily. Yes Provider, Historical  
apixaban (ELIQUIS) 5 mg tablet Take 5 mg by mouth. 12/6/19  Yes Provider, Historical  
furosemide (LASIX) 40 mg tablet Take 40 mg by mouth. 11/30/19  Yes Provider, Historical  
metoprolol succinate (TOPROL-XL) 50 mg XL tablet Take 50 mg by mouth. 12/1/19  Yes Provider, Historical  
montelukast (SINGULAIR) 10 mg tablet Take 10 mg by mouth. 11/30/19  Yes Provider, Historical  
benzonatate (TESSALON) 100 mg capsule Take 1 capsule 3 times daily as needed for coughing, swallow capsules whole. 12/6/19   Provider, Historical  
SITagliptin (JANUVIA) 50 mg tablet Take 50 mg by mouth. 12/1/19   Provider, Historical  
fluticasone propionate (FLONASE) 50 mcg/actuation nasal spray 1 [place]. 12/1/19   Provider, Historical  
 
No Known Allergies Social History Tobacco Use  Smoking status: Not on file Substance Use Topics  Alcohol use: Not on file No family history on file. Current Facility-Administered Medications Medication Dose Route Frequency  influenza vaccine 2019-20 (6 mos+)(PF) (FLUARIX/FLULAVAL/FLUZONE QUAD) injection 0.5 mL  0.5 mL IntraMUSCular PRIOR TO DISCHARGE  vancomycin (VANCOCIN) 1250 mg in  ml infusion  1,250 mg IntraVENous Q18H  
 azithromycin (ZITHROMAX) 500 mg in  mL  500 mg IntraVENous Q24H  
 0.9% sodium chloride infusion  25 mL/hr IntraVENous CONTINUOUS  
 insulin lispro (HUMALOG) injection   SubCUTAneous Q6H  
 piperacillin-tazobactam (ZOSYN) 3.375 g in 0.9% sodium chloride (MBP/ADV) 100 mL MBP  3.375 g IntraVENous Q6H Review of Systems: 
Review of systems not obtained due to patient factors. Objective:  
Vital Signs:   
Visit Vitals /81 (BP 1 Location: Left arm, BP Patient Position: At rest) Pulse (!) 114  
 Temp 98.5 °F (36.9 °C) Comment: Simultaneous filing. User may not have seen previous data. Resp (!) 35 Ht 5' 6\" (1.676 m) Wt 88.5 kg (195 lb 1.7 oz) SpO2 98% Breastfeeding No  
BMI 31.49 kg/m² O2 Device: Hi flow nasal cannula O2 Flow Rate (L/min): 50 l/min Temp (24hrs), Av.3 °F (36.8 °C), Min:97.1 °F (36.2 °C), Max:98.9 °F (37.2 °C) Intake/Output:  
Last shift:      701 - 1900 In: 48 [I.V.:50] Out: - Last 3 shifts: 1901 -  07 In: 1461.1 [I.V.:1461.1] Out: - Intake/Output Summary (Last 24 hours) at 2019 8912 Last data filed at 2019 0900 Gross per 24 hour Intake 511.1 ml Output  Net 511.1 ml Physical Exam:  
General:   Lethargic moaning and not able to communicate effectively Head:  Normocephalic, without obvious abnormality, atraumatic. Eyes:  Conjunctivae/corneas clear. PERRL, EOMs intact. Nose: Nares normal. Septum midline. Mucosa normal. No drainage or sinus tenderness. Throat: Lips, mucosa, and tongue normal. Teeth and gums normal.  Drooling and not able to clear secretions Neck: Supple, symmetrical, trachea midline, no adenopathy, thyroid: no enlargment/tenderness/nodules, no carotid bruit and no JVD. Lungs:    Absent breath sounds on left side Chest wall:  No tenderness or deformity. Mediport on right Heart:  Regular rate and rhythm, S1, S2 normal, no murmur, click, rub or gallop. Abdomen:   Soft, non-tender. Bowel sounds normal. No masses,  No organomegaly. Extremities: Extremities normal, atraumatic, no cyanosis or edema. Pulses: 2+ and symmetric all extremities. Skin: Skin color, texture, turgor normal. No rashes or lesions Neurologic: Grossly nonfocal but not able to assess adequately due to mentation Data review:  
 
Recent Results (from the past 24 hour(s)) GLUCOSE, POC Collection Time: 19  4:17 PM  
Result Value Ref Range Glucose (POC) 147 (H) 70 - 110 mg/dL EKG, 12 LEAD, SUBSEQUENT Collection Time: 12/08/19  6:52 PM  
Result Value Ref Range Ventricular Rate 108 BPM  
 Atrial Rate 108 BPM  
 P-R Interval 146 ms  
 QRS Duration 88 ms Q-T Interval 346 ms  
 QTC Calculation (Bezet) 463 ms Calculated P Axis 57 degrees Calculated R Axis -31 degrees Calculated T Axis 50 degrees Diagnosis Sinus tachycardia Left axis deviation Low voltage QRS Cannot rule out Anterior infarct (cited on or before 07-DEC-2019) Abnormal ECG When compared with ECG of 07-DEC-2019 16:08, 
premature atrial complexes are no longer present Serial changes of evolving Anterior infarct present CARDIAC PANEL,(CK, CKMB & TROPONIN) Collection Time: 12/08/19  7:00 PM  
Result Value Ref Range CK 59 26 - 192 U/L  
 CK - MB 3.4 <3.6 ng/ml CK-MB Index 5.8 (H) 0.0 - 4.0 % Troponin-I, QT 0.71 (H) 0.0 - 0.045 NG/ML  
CBC WITH AUTOMATED DIFF Collection Time: 12/08/19  7:00 PM  
Result Value Ref Range WBC 22.3 (H) 4.6 - 13.2 K/uL  
 RBC 3.89 (L) 4.20 - 5.30 M/uL  
 HGB 11.3 (L) 12.0 - 16.0 g/dL HCT 34.5 (L) 35.0 - 45.0 % MCV 88.7 74.0 - 97.0 FL  
 MCH 29.0 24.0 - 34.0 PG  
 MCHC 32.8 31.0 - 37.0 g/dL  
 RDW 14.4 11.6 - 14.5 % PLATELET 870 142 - 447 K/uL MPV 10.0 9.2 - 11.8 FL  
 NEUTROPHILS 89 (H) 42 - 75 % BAND NEUTROPHILS 4 0 - 5 % LYMPHOCYTES 4 (L) 20 - 51 % MONOCYTES 3 2 - 9 % EOSINOPHILS 0 0 - 5 % BASOPHILS 0 0 - 3 %  
 ABS. NEUTROPHILS 20.7 (H) 1.8 - 8.0 K/UL  
 ABS. LYMPHOCYTES 0.9 0.8 - 3.5 K/UL  
 ABS. MONOCYTES 0.7 0 - 1.0 K/UL  
 ABS. EOSINOPHILS 0.0 0.0 - 0.4 K/UL  
 ABS. BASOPHILS 0.0 0.0 - 0.06 K/UL  
 DF MANUAL PLATELET COMMENTS ADEQUATE PLATELETS    
 RBC COMMENTS NORMOCYTIC, NORMOCHROMIC METABOLIC PANEL, BASIC Collection Time: 12/08/19  7:00 PM  
Result Value Ref Range Sodium 141 136 - 145 mmol/L Potassium 4.3 3.5 - 5.5 mmol/L  Chloride 111 100 - 111 mmol/L  
 CO2 19 (L) 21 - 32 mmol/L  
 Anion gap 11 3.0 - 18 mmol/L Glucose 184 (H) 74 - 99 mg/dL BUN 32 (H) 7.0 - 18 MG/DL Creatinine 1.21 0.6 - 1.3 MG/DL  
 BUN/Creatinine ratio 26 (H) 12 - 20 GFR est AA 55 (L) >60 ml/min/1.73m2 GFR est non-AA 45 (L) >60 ml/min/1.73m2 Calcium 8.1 (L) 8.5 - 10.1 MG/DL PROTHROMBIN TIME + INR Collection Time: 12/08/19  7:00 PM  
Result Value Ref Range Prothrombin time 40.7 (H) 11.5 - 15.2 sec INR 4.3 (H) 0.8 - 1.2 PTT Collection Time: 12/08/19  7:00 PM  
Result Value Ref Range aPTT 32.1 23.0 - 36.4 SEC  
POC G3 Collection Time: 12/08/19  7:53 PM  
Result Value Ref Range Device: High Flow Nasal Cannula Flow rate (POC) 55 L/M  
 FIO2 (POC) 100 % pH (POC) 7.410 7.35 - 7.45    
 pCO2 (POC) 28.4 (L) 35.0 - 45.0 MMHG  
 pO2 (POC) 271 (H) 80 - 100 MMHG  
 HCO3 (POC) 18.0 (L) 22 - 26 MMOL/L  
 sO2 (POC) 100 (H) 92 - 97 % Base deficit (POC) 5 mmol/L Allens test (POC) YES Total resp. rate 30 Site LEFT RADIAL Patient temp. 98.6 Specimen type (POC) ARTERIAL Performed by Karen Pantoja GLUCOSE, POC Collection Time: 12/09/19 12:04 AM  
Result Value Ref Range Glucose (POC) 165 (H) 70 - 110 mg/dL CBC WITH AUTOMATED DIFF Collection Time: 12/09/19  3:08 AM  
Result Value Ref Range WBC 20.5 (H) 4.6 - 13.2 K/uL  
 RBC 3.78 (L) 4.20 - 5.30 M/uL  
 HGB 10.9 (L) 12.0 - 16.0 g/dL HCT 33.5 (L) 35.0 - 45.0 % MCV 88.6 74.0 - 97.0 FL  
 MCH 28.8 24.0 - 34.0 PG  
 MCHC 32.5 31.0 - 37.0 g/dL  
 RDW 14.5 11.6 - 14.5 % PLATELET 172 375 - 862 K/uL MPV 10.0 9.2 - 11.8 FL  
 NEUTROPHILS 88 (H) 42 - 75 % BAND NEUTROPHILS 5 0 - 5 % LYMPHOCYTES 4 (L) 20 - 51 % MONOCYTES 2 2 - 9 % EOSINOPHILS 0 0 - 5 % BASOPHILS 0 0 - 3 % METAMYELOCYTES 1 (H) 0 %  
 ABS. NEUTROPHILS 19.1 (H) 1.8 - 8.0 K/UL  
 ABS. LYMPHOCYTES 0.8 0.8 - 3.5 K/UL  
 ABS. MONOCYTES 0.4 0 - 1.0 K/UL  
 ABS. EOSINOPHILS 0.0 0.0 - 0.4 K/UL ABS. BASOPHILS 0.0 0.0 - 0.06 K/UL  
 DF AUTOMATED PLATELET COMMENTS ADEQUATE PLATELETS    
 RBC COMMENTS ANISOCYTOSIS 
1+ METABOLIC PANEL, BASIC Collection Time: 12/09/19  3:08 AM  
Result Value Ref Range Sodium 140 136 - 145 mmol/L Potassium 4.4 3.5 - 5.5 mmol/L Chloride 111 100 - 111 mmol/L  
 CO2 19 (L) 21 - 32 mmol/L Anion gap 10 3.0 - 18 mmol/L Glucose 189 (H) 74 - 99 mg/dL BUN 32 (H) 7.0 - 18 MG/DL Creatinine 1.17 0.6 - 1.3 MG/DL  
 BUN/Creatinine ratio 27 (H) 12 - 20 GFR est AA 57 (L) >60 ml/min/1.73m2 GFR est non-AA 47 (L) >60 ml/min/1.73m2 Calcium 8.2 (L) 8.5 - 10.1 MG/DL MAGNESIUM Collection Time: 12/09/19  3:08 AM  
Result Value Ref Range Magnesium 2.6 1.6 - 2.6 mg/dL TROPONIN I Collection Time: 12/09/19  3:08 AM  
Result Value Ref Range Troponin-I, QT 0.71 (H) 0.0 - 0.045 NG/ML  
PROTHROMBIN TIME + INR Collection Time: 12/09/19  3:08 AM  
Result Value Ref Range Prothrombin time 41.8 (H) 11.5 - 15.2 sec INR 4.4 (H) 0.8 - 1.2 PTT Collection Time: 12/09/19  3:08 AM  
Result Value Ref Range aPTT 33.1 23.0 - 36.4 SEC LACTIC ACID Collection Time: 12/09/19  3:08 AM  
Result Value Ref Range Lactic acid 3.0 (HH) 0.4 - 2.0 MMOL/L  
POC G3 Collection Time: 12/09/19  5:24 AM  
Result Value Ref Range Device: High Flow Nasal Cannula Flow rate (POC) 55 L/M  
 FIO2 (POC) 60 % pH (POC) 7.364 7.35 - 7.45    
 pCO2 (POC) 31.8 (L) 35.0 - 45.0 MMHG  
 pO2 (POC) 34 (LL) 80 - 100 MMHG  
 HCO3 (POC) 18.1 (L) 22 - 26 MMOL/L  
 sO2 (POC) 63 (L) 92 - 97 % Base deficit (POC) 7 mmol/L Allens test (POC) N/A Total resp. rate 40 Site RIGHT RADIAL Specimen type (POC) ARTERIAL Performed by Giovani Martin GLUCOSE, POC Collection Time: 12/09/19  6:51 AM  
Result Value Ref Range Glucose (POC) 166 (H) 70 - 110 mg/dL Imaging: I have personally reviewed the patients radiographs and have reviewed the reports: XR Results (most recent): 
Results from Hospital Encounter encounter on 12/07/19 XR CHEST PORT Narrative EXAM: CHEST  CPT CODE: 70303 CLINICAL INDICATION/HISTORY: Hypoxia. COMPARISON: Made December 2019. TECHNIQUE: Single AP portable view of chest at 0522. FINDINGS: There is a right internal jugular central venous infusion port 
catheter tip in superior vena cava. There may be some increased prominence of 
the right hilum. The left chest is now completely opacified with some shift of 
mediastinal contents into the right chest.  The mediastinum and heart are 
partially obscured. The bones and soft tissues are unremarkable. Impression IMPRESSION: 
 
There is now total opacification of the left chest with some shift of the 
mediastinal contents into the right chest.  The right lung is grossly clear but 
there may be some increased prominence of the right hilum. The heart is mostly 
obscured. CT Results (most recent): 
Results from Hospital Encounter encounter on 12/07/19 CT HEAD WO CONT Narrative CT OF THE HEAD WITHOUT CONTRAST. CLINICAL HISTORY: Altered mental status after trauma. Subtle generalized 
weakness and altered mental status. Lung mass diagnosed 2.5 weeks ago. TECHNIQUE: Helical scan obtained of the head were obtained from the skull vertex 
through the base of the skull without intravenous contrast.    All CT scans are 
performed using dose optimization techniques as appropriate to the performed 
exam including the following: Automated exposure control, adjustment of mA 
and/or kV according to patient size, and use of iterative reconstructive 
technique. COMPARISON: None. FINDINGS:  
 
Right parietal infarct with developing encephalomalacia and localized sulcal 
effacement is likely at least subacute-chronic. Additional evidence of small subtle infarct at the left parietal lobe, more age indeterminate. Small 
bilateral cerebellar lacunar infarcts. No intracranial hemorrhage. No mass 
effect. The visualized paranasal sinuses are clear. The mastoid air cells are 
clear. The visualized bony structures are unremarkable. Impression IMPRESSION:  
No acute traumatic findings. Age-indeterminate small left parietal lobe infarct. MRI could be utilized to 
assess for acuity. Right parietal infarct appears at least late subacute. Small cerebellar infarcts, likely chronic. Complex decision making was made in the evaluation and management plans during this consultation. More than 50% of time was spent in counseling and coordination of care including review of data and discussion with other team members.  
 
 
  
Yodit Staton MD

## 2019-12-09 NOTE — PROGRESS NOTES
HEMATOLOGY AND ONCOLOGY PROGRESS NOTE Patient: Obdulio Stubbs   MRN: [id number] YOB: 1960 Admit Date: 12/7/2019 Assessment:  
 
Active Problems: 
  Altered mental status (12/7/2019) Sepsis ([facility] [address] (12/7/2019) Acute kidney injury ([facility] [address] (12/7/2019) Elevated troponin (12/7/2019) Oncologic History: A. This is a pleasant, but unfortunate, 69-year-old woman with no past medical history of note. B. She has an extensive smoking history. C.        She presents to the hospital complaining of chest pain and shortness of breath. D.        She had previously had a month's long history of cough. E.        A CAT scan was done in the hospital, which revealed a lung mass, multifocal pulmonary embolisms, lymphadenopathy, and a pericardial effusion. F. An echocardiogram was done, which revealed a pericardial effusion. G.        She underwent drainage of the pericardial fluid with 600 cc removed on 11/12/19. H. Pericardial fluid was negative for malignancy. I.        She subsequently had a left pleural effusion drained. Cells were suspicious for malignancy. J.        Biopsy of a neck lesion was performed on 11/15/19 and was positive for metastatic adenocarcinoma. K.        MRI brain was performed on 11/26/19 revealing no evidence of metastatic disease. Graydon Jaime was placed. M.        Bone scan revealed no evidence of metastatic disease. N. She was subsequently discharged home after her MediPort was placed and being started on Eliquis. O.        Pathology revealed tumor that was PD-L1 positive at 5%. Plan: - At this point, she needs further management to optimize her respiratory status, I agree with thoracentesis and bronchoscopy. - Her coagulopathy is not explained by Eliquis alone unless she had an overdose d/t kidney failure. - Will check coag with mixing studies and administer x1 dose of vitamin K. - She needs a brain MTI once her respiratory status is better. - She is scheduled for C1 of chemotherapy on 12/12, I do not think her current performance would support that. - Will ask palliative care to see her. It is very reasonable to update the family with her prognosis and discuss goals of care. Minerva Lobo MD 
[facility] Office [phone] Subjective:  
 
Patient is known to the [facility] hematology and oncology service. She has a newly diagnosed NSCLC as outlined above. She is admitted after presentation with mental status changes and confusion. She was brought by her family d/t the aforementioned symptoms. She was noted to be hypoxic and was placed on BiPAP which subsequently has been transitioned to high flow nasal cannula. The head CT scan revealed age-indeterminate small left parietal lobe infarct, rght parietal infarct appears at least late subacute, and small cerebellar infarcts, likely chronic. Portable CXR showed a moderate to large right pleural effusion and decreased aeration in the left lung compatible with passive atelectasis. Again, there are mediastinal and right hilar adenopathy. Her labs are remarkable of anemia, coagulopathy and FISH. Since admission, she has been evaluated by the cardiology and nephrology teams. She was also seen by the pulmonary service this AM. Patient is not a good historian, she only reports SOB and does not answer any other questions. Objective:  
 
Patient Vitals for the past 24 hrs: 
 BP Temp Pulse Resp SpO2 Height Weight 12/09/19 0831       88.5 kg (195 lb 1.7 oz) 12/09/19 0800 129/81  (!) 114 (!) 35 98 %    
12/09/19 0531     98 %    
12/09/19 0200 102/70  (!) 105 (!) 31 95 %    
12/09/19 0130 141/88  (!) 109 (!) 32 94 %    
12/09/19 0100 132/83  (!) 110 (!) 39 93 %    
12/09/19 0030 131/84  (!) 108 30 92 %    
12/09/19 0000 113/79  Ivett Marines     
12/08/19 2330 115/72  (!) 102 (!) 32 100 %   19 126/72  (!) 104      
19 126/72 98.7 °F (37.1 °C) (!) 107 (!) 33 100 %    
19 1557 114/67 97.1 °F (36.2 °C) 96 22 96 %    
19 1547     97 %    
19 1500 118/75    96 %    
19 1431     99 %    
19 1430 118/90        
19 1325      5' 6\" (1.676 m)   
19 1230 135/82    97 %    
19 1200 107/63    98 %    
19 1030 117/71    98 %    
19 1000 106/84    97 %    
19 0930 (!) 113/92    93 %    
19 0900 113/80    100 %    
19 0855     100 %   Intake/Output Summary (Last 24 hours) at 2019 0213 Last data filed at 2019 Gross per 24 hour Intake 436.1 ml Output  Net 436.1 ml Temp (24hrs), Av.9 °F (36.6 °C), Min:97.1 °F (36.2 °C), Max:98.7 °F (37.1 °C) Review Of Systems:  
 
Constitutional: negative for fevers, chills, sweats and fatigue Eyes: negative for visual disturbance, redness and icterus Ears, Nose, Mouth, Throat, and Face: negative for tinnitus, epistaxis, sore mouth and hoarseness Respiratory: negative for cough, sputum, hemoptysis, pleurisy/chest pain or wheezing. Cardiovascular: negative for chest pain, chest pressure/discomfort, palpitations, irregular heart beats, syncope, paroxysmal nocturnal dyspnea Gastrointestinal: negative for reflux symptoms, nausea, vomiting, change in bowel habits, melena, diarrhea, constipation and abdominal pain. Genitourinary:negative for dysuria, nocturia, urinary incontinence, hesitancy and hematuria Integument: negative for rash, skin lesion(s) and pruritus Hematologic/Lymphatic: negative for easy bruising, bleeding and lymphadenopathy Musculoskeletal:negative for myalgias, arthralgias and bone pain Neurological: negative for headaches, dizziness, seizures, paresthesia and weakness. Physical Exam:  
 
Constitutional: Alert, she is not oriented. Eyes: PERRLA, anicteric, no redness. + Phlegm. HEENT: no palpable Lymph nodes, no mucositis, no thrush. Respiratory: symmetrical expansion, decreased over right side. Cardiovascular: S1S2 positive. Gastrointestinal: soft, benign, non tender, no palpable mass. Integument: no rash, no petechiae, no ecchymosis. Musculoskeletal: no edema, no cyanosis, no clubbing. Neurological: intact, symmetrical exam with no focal motor deficits. Unable to evaluate sensation. Lab:  
 
Recent Results (from the past 24 hour(s)) GLUCOSE, POC Collection Time: 12/08/19  4:17 PM  
Result Value Ref Range Glucose (POC) 147 (H) 70 - 110 mg/dL EKG, 12 LEAD, SUBSEQUENT Collection Time: 12/08/19  6:52 PM  
Result Value Ref Range Ventricular Rate 108 BPM  
 Atrial Rate 108 BPM  
 P-R Interval 146 ms  
 QRS Duration 88 ms Q-T Interval 346 ms  
 QTC Calculation (Bezet) 463 ms Calculated P Axis 57 degrees Calculated R Axis -31 degrees Calculated T Axis 50 degrees Diagnosis Sinus tachycardia Left axis deviation Low voltage QRS Cannot rule out Anterior infarct (cited on or before 07-DEC-2019) Abnormal ECG When compared with ECG of 07-DEC-2019 16:08, 
premature atrial complexes are no longer present Serial changes of evolving Anterior infarct present CARDIAC PANEL,(CK, CKMB & TROPONIN) Collection Time: 12/08/19  7:00 PM  
Result Value Ref Range CK 59 26 - 192 U/L  
 CK - MB 3.4 <3.6 ng/ml CK-MB Index 5.8 (H) 0.0 - 4.0 % Troponin-I, QT 0.71 (H) 0.0 - 0.045 NG/ML  
CBC WITH AUTOMATED DIFF Collection Time: 12/08/19  7:00 PM  
Result Value Ref Range WBC 22.3 (H) 4.6 - 13.2 K/uL  
 RBC 3.89 (L) 4.20 - 5.30 M/uL  
 HGB 11.3 (L) 12.0 - 16.0 g/dL HCT 34.5 (L) 35.0 - 45.0 % MCV 88.7 74.0 - 97.0 FL  
 MCH 29.0 24.0 - 34.0 PG  
 MCHC 32.8 31.0 - 37.0 g/dL  
 RDW 14.4 11.6 - 14.5 % PLATELET 584 049 - 124 K/uL MPV 10.0 9.2 - 11.8 FL  
 NEUTROPHILS 89 (H) 42 - 75 % BAND NEUTROPHILS 4 0 - 5 % LYMPHOCYTES 4 (L) 20 - 51 % MONOCYTES 3 2 - 9 % EOSINOPHILS 0 0 - 5 % BASOPHILS 0 0 - 3 %  
 ABS. NEUTROPHILS 20.7 (H) 1.8 - 8.0 K/UL  
 ABS. LYMPHOCYTES 0.9 0.8 - 3.5 K/UL  
 ABS. MONOCYTES 0.7 0 - 1.0 K/UL  
 ABS. EOSINOPHILS 0.0 0.0 - 0.4 K/UL  
 ABS. BASOPHILS 0.0 0.0 - 0.06 K/UL  
 DF MANUAL PLATELET COMMENTS ADEQUATE PLATELETS    
 RBC COMMENTS NORMOCYTIC, NORMOCHROMIC METABOLIC PANEL, BASIC Collection Time: 12/08/19  7:00 PM  
Result Value Ref Range Sodium 141 136 - 145 mmol/L Potassium 4.3 3.5 - 5.5 mmol/L Chloride 111 100 - 111 mmol/L  
 CO2 19 (L) 21 - 32 mmol/L Anion gap 11 3.0 - 18 mmol/L Glucose 184 (H) 74 - 99 mg/dL BUN 32 (H) 7.0 - 18 MG/DL Creatinine 1.21 0.6 - 1.3 MG/DL  
 BUN/Creatinine ratio 26 (H) 12 - 20 GFR est AA 55 (L) >60 ml/min/1.73m2 GFR est non-AA 45 (L) >60 ml/min/1.73m2 Calcium 8.1 (L) 8.5 - 10.1 MG/DL PROTHROMBIN TIME + INR Collection Time: 12/08/19  7:00 PM  
Result Value Ref Range Prothrombin time 40.7 (H) 11.5 - 15.2 sec INR 4.3 (H) 0.8 - 1.2 PTT Collection Time: 12/08/19  7:00 PM  
Result Value Ref Range aPTT 32.1 23.0 - 36.4 SEC  
POC G3 Collection Time: 12/08/19  7:53 PM  
Result Value Ref Range Device: High Flow Nasal Cannula Flow rate (POC) 55 L/M  
 FIO2 (POC) 100 % pH (POC) 7.410 7.35 - 7.45    
 pCO2 (POC) 28.4 (L) 35.0 - 45.0 MMHG  
 pO2 (POC) 271 (H) 80 - 100 MMHG  
 HCO3 (POC) 18.0 (L) 22 - 26 MMOL/L  
 sO2 (POC) 100 (H) 92 - 97 % Base deficit (POC) 5 mmol/L Allens test (POC) YES Total resp. rate 30 Site LEFT RADIAL Patient temp. 98.6 Specimen type (POC) ARTERIAL Performed by Sarah Jiménez GLUCOSE, POC Collection Time: 12/09/19 12:04 AM  
Result Value Ref Range Glucose (POC) 165 (H) 70 - 110 mg/dL CBC WITH AUTOMATED DIFF Collection Time: 12/09/19  3:08 AM  
Result Value Ref Range WBC 20.5 (H) 4.6 - 13.2 K/uL  
 RBC 3.78 (L) 4.20 - 5.30 M/uL  
 HGB 10.9 (L) 12.0 - 16.0 g/dL HCT 33.5 (L) 35.0 - 45.0 % MCV 88.6 74.0 - 97.0 FL  
 MCH 28.8 24.0 - 34.0 PG  
 MCHC 32.5 31.0 - 37.0 g/dL  
 RDW 14.5 11.6 - 14.5 % PLATELET 070 391 - 546 K/uL MPV 10.0 9.2 - 11.8 FL  
 NEUTROPHILS 88 (H) 42 - 75 % BAND NEUTROPHILS 5 0 - 5 % LYMPHOCYTES 4 (L) 20 - 51 % MONOCYTES 2 2 - 9 % EOSINOPHILS 0 0 - 5 % BASOPHILS 0 0 - 3 % METAMYELOCYTES 1 (H) 0 %  
 ABS. NEUTROPHILS 19.1 (H) 1.8 - 8.0 K/UL  
 ABS. LYMPHOCYTES 0.8 0.8 - 3.5 K/UL  
 ABS. MONOCYTES 0.4 0 - 1.0 K/UL  
 ABS. EOSINOPHILS 0.0 0.0 - 0.4 K/UL  
 ABS. BASOPHILS 0.0 0.0 - 0.06 K/UL  
 DF AUTOMATED PLATELET COMMENTS ADEQUATE PLATELETS    
 RBC COMMENTS ANISOCYTOSIS 
1+ METABOLIC PANEL, BASIC Collection Time: 12/09/19  3:08 AM  
Result Value Ref Range Sodium 140 136 - 145 mmol/L Potassium 4.4 3.5 - 5.5 mmol/L Chloride 111 100 - 111 mmol/L  
 CO2 19 (L) 21 - 32 mmol/L Anion gap 10 3.0 - 18 mmol/L Glucose 189 (H) 74 - 99 mg/dL BUN 32 (H) 7.0 - 18 MG/DL Creatinine 1.17 0.6 - 1.3 MG/DL  
 BUN/Creatinine ratio 27 (H) 12 - 20 GFR est AA 57 (L) >60 ml/min/1.73m2 GFR est non-AA 47 (L) >60 ml/min/1.73m2 Calcium 8.2 (L) 8.5 - 10.1 MG/DL MAGNESIUM Collection Time: 12/09/19  3:08 AM  
Result Value Ref Range Magnesium 2.6 1.6 - 2.6 mg/dL TROPONIN I Collection Time: 12/09/19  3:08 AM  
Result Value Ref Range Troponin-I, QT 0.71 (H) 0.0 - 0.045 NG/ML  
PROTHROMBIN TIME + INR Collection Time: 12/09/19  3:08 AM  
Result Value Ref Range Prothrombin time 41.8 (H) 11.5 - 15.2 sec INR 4.4 (H) 0.8 - 1.2 PTT Collection Time: 12/09/19  3:08 AM  
Result Value Ref Range aPTT 33.1 23.0 - 36.4 SEC LACTIC ACID Collection Time: 12/09/19  3:08 AM  
Result Value Ref Range Lactic acid 3.0 (HH) 0.4 - 2.0 MMOL/L  
POC G3  Collection Time: 12/09/19  5:24 AM  
 Result Value Ref Range Device: High Flow Nasal Cannula Flow rate (POC) 55 L/M  
 FIO2 (POC) 60 % pH (POC) 7.364 7.35 - 7.45    
 pCO2 (POC) 31.8 (L) 35.0 - 45.0 MMHG  
 pO2 (POC) 34 (LL) 80 - 100 MMHG  
 HCO3 (POC) 18.1 (L) 22 - 26 MMOL/L  
 sO2 (POC) 63 (L) 92 - 97 % Base deficit (POC) 7 mmol/L Allens test (POC) N/A Total resp. rate 40 Site RIGHT RADIAL Specimen type (POC) ARTERIAL Performed by Freddie Simmons GLUCOSE, POC Collection Time: 12/09/19  6:51 AM  
Result Value Ref Range Glucose (POC) 166 (H) 70 - 110 mg/dL

## 2019-12-09 NOTE — CDMP QUERY
Patient admitted with hx lung CA w/ ? pneumonia, noted to have elevated troponin. If possible, please document in progress notes and d/c summary if you are evaluating and/or treating any of the following: 
 
=> Type 2 MI due to demand 
=> NSTEMI 
=>Demand Ischemia 
=> Other Explanation of clinical findings 
=> Clinically Undetermined (no explanation for clinical findings) The medical record reflects the following: 
   Risk Factors: 61 yo with lung CA, PE, resp failure Clinical Indicators:  
12/8 card:  suspect her intermediate troponin's are related to supply demand mismatch though EKG yesterday abnormal and concerning for acute lateral MI. Will repeat EKG 
12/8 Dx: elevated trop, likely demand ischemia 
repeat EKG: Serial changes of evolving Anterior infarct present  
troponin trended:  0.29, 0.47, 0.56, 0.71 Treatment: cardiology consult, BiPAP, IV abx Type 2 (MI secondary to an ischemic imbalance): MI consequent to increased oxygen demand or decreased supply (eg, coronary endothelial dysfunction, coronary artery spasm, coronary artery embolus, tachy-/willa-arrhythmias, anemia, respiratory failure, hypertension, or hypotension). Reference Ismael KIRK, Angela Kaufman MD MPH, Leighton Brand. (July 5, 2016). Criteria for the Diagnosis of Acute Myocardial Infarction. In UpToDate (Topic  52, Version 36.0). Retrieved from [email or web address] Thank you. Veena Sunshine RN BSN CCDS [phone]

## 2019-12-10 NOTE — PROGRESS NOTES
PCCM update: More persistent tachycardia noted that appears to have converted from sinus tachycardia earlier today to a fib w/ RVR. Started on amiodarone gtt. Repeat cardiac enzymes pending. 2230 - worsening troponin elevation noted from 0.71 at 0308 this AM to 4.60 at 2143. Discussed w/ cardiology on call. Due to persistently elevated INR, remains unable to be restarted on heparin gtt. Will continue to trend Molly and PT/INR.   
 
Stefano Hurt PA-C

## 2019-12-10 NOTE — PROGRESS NOTES
HEMATOLOGY AND ONCOLOGY PROGRESS NOTE Patient: Claire Ray   MRN: [id number] YOB: 1960 Admit Date: 12/7/2019 Assessment: # Altered mental status (12/7/2019) # Sepsis ([facility] [address] (12/7/2019) # Acute kidney injury ([facility] [address] (12/7/2019) # Elevated troponin (12/7/2019) # Coagulopathy:  
 
Oncologic History: A. This is a pleasant, but unfortunate, 42-year-old woman with no past medical history of note. B. She has an extensive smoking history. C.        She presents to the hospital complaining of chest pain and shortness of breath. D.        She had previously had a month's long history of cough. E.        A CAT scan was done in the hospital, which revealed a lung mass, multifocal pulmonary embolisms, lymphadenopathy, and a pericardial effusion. F. An echocardiogram was done, which revealed a pericardial effusion. G.        She underwent drainage of the pericardial fluid with 600 cc removed on 11/12/19. H. Pericardial fluid was negative for malignancy. I.        She subsequently had a left pleural effusion drained. Cells were suspicious for malignancy. J.        Biopsy of a neck lesion was performed on 11/15/19 and was positive for metastatic adenocarcinoma. K.        MRI brain was performed on 11/26/19 revealing no evidence of metastatic disease. Susie Gain was placed. M.        Bone scan revealed no evidence of metastatic disease. N. She was subsequently discharged home after her MediPort was placed and being started on Eliquis. O.        Pathology revealed tumor that was PD-L1 positive at 5%. Plan: - Events over the last 24 hr are noted, patient with elevated Troponin now, rise of lactic acid, elevated Cr and persistent coagulopathy as well as encephalopathy.  
- Will follow up on LFT, fibrinogen and LDH, very possible that she is in DIC.  
- Overall, her prognosis seems to be poor.  I already placed an order for palliative care to meet with the patient and her family. - Will continue to monitor counts and coag. - Can do plasma products and Cryo based on coagulation tests. - I discussed the case with the ICU team  
 
Kelly Emmaunel MD 
[facility] Office [phone] Subjective: She is intubated and sedated. Not following commands. Objective:  
 
Patient Vitals for the past 24 hrs: 
 BP Temp Pulse Resp SpO2 Height Weight 12/10/19 0945   (!) 39 (!) 0     
12/10/19 0930 (!) 72/53  (!) 124 19 98 %    
12/10/19 0845   98 18 100 %    
12/10/19 0830 (!) 90/28  (!) 125 (!) 36 100 %    
12/10/19 0815 138/88  (!) 116 (!) 36 100 %    
12/10/19 0800 (!) 121/98 99.1 °F (37.3 °C) (!) 114 (!) 32 100 % 5' 6\" (1.676 m) 88.7 kg (195 lb 8.8 oz) 12/10/19 0754   (!) 112 (!) 37 100 %    
12/10/19 0745 109/72  (!) 121 (!) 38 96 %    
12/10/19 0730 (!) 87/27  (!) 124 (!) 37 100 %    
12/10/19 0715 (!) 134/97  98 (!) 36 100 %    
12/10/19 0700   (!) 103 (!) 36 99 %    
12/10/19 0645 (!) 152/106  (!) 132 (!) 35 98 %    
12/10/19 0630   (!) 125 (!) 35 98 %    
12/10/19 0615   (!) 107 (!) 33     
12/10/19 0600 (!) 137/102  (!) 138 (!) 32     
12/10/19 0558       88.9 kg (195 lb 15.8 oz) 12/10/19 0545 (!) 90/25  (!) 101 (!) 34 99 %    
12/10/19 0530 132/89  (!) 22 (!) 31     
12/10/19 0515 109/78  (!) 130 (!) 38 98 %    
12/10/19 0500 124/56  (!) 109 (!) 37     
12/10/19 0445 115/79  (!) 109 (!) 36 99 %    
12/10/19 0430 110/58  (!) 130 (!) 36     
12/10/19 0415 131/65  100 (!) 36 100 %    
12/10/19 0400 120/68 98.9 °F (37.2 °C) (!) 101 (!) 35 100 %    
12/10/19 0356   (!) 102 (!) 36 100 %    
12/10/19 0345 (!) 122/93  98 (!) 35 99 %    
12/10/19 0330 113/81  (!) 103 (!) 35 100 %    
12/10/19 0319 130/72  (!) 101 (!) 36 97 %    
12/10/19 0312 112/49        
12/10/19 0230 132/60  97 (!) 39 100 %   12/10/19 0215 113/55  93 (!) 31 100 %    
12/10/19 0200 122/61  97 (!) 38 100 %    
12/10/19 0145 94/56  93 (!) 37 100 %    
12/10/19 0130 105/53  (!) 102 (!) 38 100 %    
12/10/19 0115 108/56  (!) 118 (!) 38 100 %    
12/10/19 0100 101/43  (!) 101 (!) 38 100 %    
12/10/19 0045 114/56  94 (!) 37 100 %    
12/10/19 0030 113/67  95 (!) 38 100 %    
12/10/19 0015 (!) 89/23  99 (!) 38 100 %    
12/10/19 0000 (!) 121/37 98.9 °F (37.2 °C) 93 (!) 37 100 %    
12/09/19 2350   98 (!) 38 100 %    
12/09/19 2345 130/72  98 (!) 37     
12/09/19 2330 106/77  (!) 123 (!) 38     
12/09/19 2315 (!) 103/17  (!) 101 (!) 38 100 %    
12/09/19 2300 (!) 142/111  (!) 101 (!) 36 (!) 89 %    
12/09/19 2245 121/69  (!) 110 (!) 38 99 %    
12/09/19 2230 (!) 137/94  (!) 102 (!) 38 100 %    
12/09/19 2215 (!) 128/93  100 (!) 36 100 %    
12/09/19 2200 113/65  (!) 141 (!) 38 96 %    
12/09/19 2145 (!) 70/13  (!) 113 (!) 35 100 %    
12/09/19 2130 (!) 134/32  (!) 126 (!) 36 99 %    
12/09/19 2115 134/65  (!) 137 (!) 37 95 %    
12/09/19 2100 99/84  (!) 129 (!) 36 96 %    
12/09/19 2045 126/45  (!) 113 28 100 %    
12/09/19 2030 146/68  (!) 113 16     
12/09/19 2015 103/62  (!) 140 30 100 %    
12/09/19 2000 95/67 98.3 °F (36.8 °C) (!) 129 28 100 %    
12/09/19 1945 (!) 88/51  (!) 119 25 100 %    
12/09/19 1930 110/60  (!) 115 26 100 %    
12/09/19 1917   (!) 108 24 100 %    
12/09/19 1915 95/57  (!) 107 28 100 %    
12/09/19 1900 109/68  (!) 128 20 100 %    
12/09/19 1845 108/53  (!) 108 25 100 %    
12/09/19 1830 99/61  (!) 110 25 99 %    
12/09/19 1815 97/51  (!) 115 26 100 %    
12/09/19 1800 101/73  (!) 118 21 98 %    
12/09/19 1745 103/59  (!) 117 23 96 %    
12/09/19 1730 100/64  (!) 114 20 94 %    
12/09/19 1715 104/50  (!) 116 21 95 %    
12/09/19 1700 118/71  (!) 120 18    19 1645 113/87  (!) 114 18     
19 1630 (!) 115/39  (!) 115 18     
19 1615 122/53  (!) 118 18     
19 1600 125/72 98.5 °F (36.9 °C) (!) 120 20     
19 1553   (!) 102 18 100 %    
19 1530 117/66  (!) 112 10     
19 1500 107/51  (!) 134 12     
19 1450 116/82  (!) 109 20 100 %    
19 1441   (!) 115 10 100 %    
19 1440 134/89  (!) 112 18 100 %    
19 1430 132/76  (!) 110 18 97 %    
19 1420 130/64  (!) 106 13 97 %    
19 1410 101/81  96 28 99 %    
19 1405 (!) 89/49  (!) 117 28     
19 1400 (!) 160/126  (!) 113 27 99 %    
19 1355 (!) 60/24  (!) 137 (!) 32     
19 1300 114/71  (!) 104 29 90 %    
19 1248 102/70     5' 6\" (1.676 m) 88.5 kg (195 lb) 19 1225     96 %   Intake/Output Summary (Last 24 hours) at 12/10/2019 1224 Last data filed at 12/10/2019 0800 Gross per 24 hour Intake 2417.2 ml Output 575 ml Net 1842.2 ml Temp (24hrs), Av.7 °F (37.1 °C), Min:98.3 °F (36.8 °C), Max:99.1 °F (37.3 °C) Review Of Systems:  
 
She is intubated, could not complete ROS. Physical Exam:  
 
Constitutional: sedated and intubated. Eyes: PERRLA, anicteric, no redness. + Phlegm. HEENT: ET. Respiratory: symmetrical expansion, decreased over right side. Cardiovascular: S1S2 positive. Gastrointestinal: soft, benign, NT and ND. Integument: no rash, no petechiae, no ecchymosis. Musculoskeletal: no edema. Neurological: she is intubated and sedated. Lab:  
 
Recent Results (from the past 24 hour(s)) ECHO ADULT FOLLOW-UP OR LIMITED Collection Time: 19  1:14 PM  
Result Value Ref Range LVIDd 2.74 (A) 3.9 - 5.3 cm  
 LVPWd 1.20 (A) 0.6 - 0.9 cm LVIDs 2.38 cm  IVSd 1.17 (A) 0.6 - 0.9 cm  
 LV ED Vol A2C 39.3 mL  
 LV ES Vol A4C 28.1 mL  
 LV ES Vol BP 25.5 19 - 49 mL  
 BP EF 48.1 (A) 55 - 100 % LV Ejection Fraction MOD 4C 41 % LV Ejection Fraction MOD 2C 49 % LV Mass .2 67 - 162 g  
 LV Mass AL Index 52.7 43 - 95 g/m2 LV ES Vol A2C 20.2 mL  
 LVES Vol Index BP 12.9 mL/m2 LV ED Vol A4C 47.4 mL  
 LVED Vol Index BP 24.8 mL/m2 LV ED Vol BP 49.1 (A) 56 - 104 ml  
 LVED Vol Index A4C 24.0 mL/m2 LVED Vol Index A2C 19.9 mL/m2 LVES Vol Index A4C 14.2 mL/m2 LVES Vol Index A2C 10.2 mL/m2 Left Ventricular Fractional Shortening by 2D 22.646836088 % Left Ventricular End Diastolic Volume by Teichholz Method 3.49382494585 mL Left Ventricular End Systolic Volume by Teichholz Method 6.91379605440 mL Left Ventricular Stroke Volume by 2-D Biplane-MOD 34.110096389 mL Left Ventricular Stroke Volume by 2-D Single Plane- MOD 7.946188759 mL Left Ventricular Stroke Volume by Teichholz Method 3.6074841698 mL Left Ventricular Stroke Volume by 2-D Single Plane- MOD 7.3780445782 mL  
POC G3 Collection Time: 12/09/19  1:16 PM  
Result Value Ref Range Device: High Flow Nasal Cannula Flow rate (POC) 40 L/M  
 FIO2 (POC) 60 % pH (POC) 7.427 7.35 - 7.45    
 pCO2 (POC) 26.7 (L) 35.0 - 45.0 MMHG  
 pO2 (POC) 73 (L) 80 - 100 MMHG  
 HCO3 (POC) 17.6 (L) 22 - 26 MMOL/L  
 sO2 (POC) 95 92 - 97 % Base deficit (POC) 7 mmol/L Allens test (POC) YES Total resp. rate 36 Site RIGHT RADIAL Specimen type (POC) ARTERIAL Performed by Joanne uHrt URINALYSIS W/MICROSCOPIC Collection Time: 12/09/19  3:10 PM  
Result Value Ref Range Color DARK YELLOW Appearance TURBID Specific gravity 1.030 1.005 - 1.030    
 pH (UA) 5.0 5.0 - 8.0 Protein 30 (A) NEG mg/dL Glucose NEGATIVE  NEG mg/dL Ketone NEGATIVE  NEG mg/dL Bilirubin SMALL (A) NEG Blood NEGATIVE  NEG Urobilinogen 2.0 (H) 0.2 - 1.0 EU/dL Nitrites NEGATIVE  NEG  Leukocyte Esterase NEGATIVE  NEG    
 WBC 0 to 3 0 - 5 /hpf  
 RBC 0 to 3 0 - 5 /hpf Epithelial cells 1+ 0 - 5 /lpf Bacteria 1+ (A) NEG /hpf  
 Uric acid crystals 4+ (A) NEG  
CULTURE, RESPIRATORY/SPUTUM/BRONCH W GRAM STAIN Collection Time: 12/09/19  3:10 PM  
Result Value Ref Range Special Requests: LEFT LOWER LOBE   
 GRAM STAIN NO WBC'S SEEN    
 GRAM STAIN NO ORGANISMS SEEN Culture result: NO GROWTH AFTER 19 HOURS    
BRONCH. LAVAGE DIFF. Collection Time: 12/09/19  3:10 PM  
Result Value Ref Range BRCH LAVAGE DIFF        
 BRCH NEUTROPHIL 36 % [facility] MACROPHAGES 55 % BRCH LYMPHS 9 % BRCH EOSINS 0 % POC G3 Collection Time: 12/09/19  3:51 PM  
Result Value Ref Range Device: VENT    
 FIO2 (POC) 100 % pH (POC) 7.100 (LL) 7.35 - 7.45    
 pCO2 (POC) 71.0 (H) 35.0 - 45.0 MMHG  
 pO2 (POC) 144 (H) 80 - 100 MMHG  
 HCO3 (POC) 22.0 22 - 26 MMOL/L  
 sO2 (POC) 98 (H) 92 - 97 % Base deficit (POC) 8 mmol/L Mode ASSIST CONTROL Tidal volume 400 ml Set Rate 10 bpm  
 PEEP/CPAP (POC) 5 cmH2O Allens test (POC) YES Inspiratory Time 0.9 sec Total resp. rate 10 Site RIGHT RADIAL Specimen type (POC) ARTERIAL Performed by Larry Speller Volume control plus YES    
POC G3 Collection Time: 12/09/19  5:33 PM  
Result Value Ref Range Device: VENT    
 FIO2 (POC) 60 % pH (POC) 7.219 (LL) 7.35 - 7.45    
 pCO2 (POC) 44.3 35.0 - 45.0 MMHG  
 pO2 (POC) 86 80 - 100 MMHG  
 HCO3 (POC) 18.1 (L) 22 - 26 MMOL/L  
 sO2 (POC) 94 92 - 97 % Base deficit (POC) 10 mmol/L Mode ASSIST CONTROL Tidal volume 400 ml Set Rate 18 bpm  
 PEEP/CPAP (POC) 5 cmH2O Allens test (POC) YES Inspiratory Time 0.9 sec Total resp. rate 19 Site RIGHT RADIAL Specimen type (POC) ARTERIAL Performed by Larry Speller Volume control plus YES    
GLUCOSE, POC Collection Time: 12/09/19  5:34 PM  
Result Value Ref Range Glucose (POC) 116 (H) 70 - 110 mg/dL LACTIC ACID  
 Collection Time: 12/09/19  8:51 PM  
Result Value Ref Range Lactic acid 5.4 (HH) 0.4 - 2.0 MMOL/L  
POC G3 Collection Time: 12/09/19  8:54 PM  
Result Value Ref Range Device: VENT    
 FIO2 (POC) 60 % pH (POC) 7.333 (L) 7.35 - 7.45    
 pCO2 (POC) 31.5 (L) 35.0 - 45.0 MMHG  
 pO2 (POC) 125 (H) 80 - 100 MMHG  
 HCO3 (POC) 16.7 (L) 22 - 26 MMOL/L  
 sO2 (POC) 99 (H) 92 - 97 % Base deficit (POC) 9 mmol/L Mode ASSIST CONTROL Tidal volume 400 ml Set Rate 20 bpm  
 PEEP/CPAP (POC) 5 cmH2O  
 PIP (POC) 34 Allens test (POC) YES Inspiratory Time 0.9 sec Total resp. rate 28 Site RIGHT RADIAL Specimen type (POC) ARTERIAL Performed by [employer] Volume control plus YES    
EKG, 12 LEAD, SUBSEQUENT Collection Time: 12/09/19  9:24 PM  
Result Value Ref Range Ventricular Rate 140 BPM  
 Atrial Rate 150 BPM  
 QRS Duration 68 ms Q-T Interval 260 ms QTC Calculation (Bezet) 396 ms Calculated R Axis -39 degrees Calculated T Axis 22 degrees Diagnosis Supraventricular tachycardia Left axis deviation Low voltage QRS Inferior infarct (cited on or before 09-DEC-2019) Cannot rule out Anterior infarct (cited on or before 07-DEC-2019) Lateral injury pattern ACUTE MI 
Abnormal ECG When compared with ECG of 09-DEC-2019 14:09, 
Questionable change in QRS duration Questionable change in initial forces of Lateral leads CARDIAC PANEL,(CK, CKMB & TROPONIN) Collection Time: 12/09/19  9:43 PM  
Result Value Ref Range  26 - 192 U/L  
 CK - MB 16.5 (H) <3.6 ng/ml CK-MB Index 9.2 (H) 0.0 - 4.0 % Troponin-I, QT 4.60 (HH) 0.0 - 0.045 NG/ML  
GLUCOSE, POC Collection Time: 12/09/19 11:26 PM  
Result Value Ref Range Glucose (POC) 178 (H) 70 - 110 mg/dL LACTIC ACID Collection Time: 12/10/19  1:01 AM  
Result Value Ref Range Lactic acid 6.6 (HH) 0.4 - 2.0 MMOL/L  
POC G3  Collection Time: 12/10/19  3:48 AM  
 Result Value Ref Range Device: VENT    
 FIO2 (POC) 50 % pH (POC) 7.374 7.35 - 7.45    
 pCO2 (POC) 24.4 (L) 35.0 - 45.0 MMHG  
 pO2 (POC) 74 (L) 80 - 100 MMHG  
 HCO3 (POC) 14.2 (L) 22 - 26 MMOL/L  
 sO2 (POC) 95 92 - 97 % Base deficit (POC) 11 mmol/L Mode ASSIST CONTROL Tidal volume 400 ml Set Rate 20 bpm  
 PEEP/CPAP (POC) 5 cmH2O  
 PIP (POC) 31 Allens test (POC) YES Inspiratory Time 0.9 sec Total resp. rate 36 Site RIGHT RADIAL Specimen type (POC) ARTERIAL Performed by Blanca Bernardo Volume control plus YES    
METABOLIC PANEL, BASIC Collection Time: 12/10/19  4:30 AM  
Result Value Ref Range Sodium 141 136 - 145 mmol/L Potassium 4.6 3.5 - 5.5 mmol/L Chloride 112 (H) 100 - 111 mmol/L  
 CO2 17 (L) 21 - 32 mmol/L Anion gap 12 3.0 - 18 mmol/L Glucose 237 (H) 74 - 99 mg/dL BUN 49 (H) 7.0 - 18 MG/DL Creatinine 2.05 (H) 0.6 - 1.3 MG/DL  
 BUN/Creatinine ratio 24 (H) 12 - 20 GFR est AA 30 (L) >60 ml/min/1.73m2 GFR est non-AA 25 (L) >60 ml/min/1.73m2 Calcium 7.8 (L) 8.5 - 10.1 MG/DL  
CALCIUM, IONIZED Collection Time: 12/10/19  4:30 AM  
Result Value Ref Range Ionized Calcium 1.08 (L) 1.12 - 1.32 MMOL/L  
LACTIC ACID Collection Time: 12/10/19  4:30 AM  
Result Value Ref Range Lactic acid 6.1 (HH) 0.4 - 2.0 MMOL/L  
PROTHROMBIN TIME + INR Collection Time: 12/10/19  4:30 AM  
Result Value Ref Range Prothrombin time >75.0 (H) 11.5 - 15.2 sec INR >9.2 (HH) 0.8 - 1.2  
CBC WITH AUTOMATED DIFF Collection Time: 12/10/19  4:30 AM  
Result Value Ref Range WBC 19.6 (H) 4.6 - 13.2 K/uL  
 RBC 3.56 (L) 4.20 - 5.30 M/uL  
 HGB 10.0 (L) 12.0 - 16.0 g/dL HCT 32.3 (L) 35.0 - 45.0 % MCV 90.7 74.0 - 97.0 FL  
 MCH 28.1 24.0 - 34.0 PG  
 MCHC 31.0 31.0 - 37.0 g/dL  
 RDW 15.1 (H) 11.6 - 14.5 % PLATELET 524 (L) 932 - 420 K/uL MPV 10.6 9.2 - 11.8 FL  
 NEUTROPHILS 91 (H) 42 - 75 % LYMPHOCYTES 4 (L) 20 - 51 % MONOCYTES 5 2 - 9 % EOSINOPHILS 0 0 - 5 % BASOPHILS 0 0 - 3 % NRBC 8.0 (H) 0  WBC  
 ABS. NEUTROPHILS 17.8 (H) 1.8 - 8.0 K/UL  
 ABS. LYMPHOCYTES 0.8 0.8 - 3.5 K/UL  
 ABS. MONOCYTES 1.0 0 - 1.0 K/UL  
 ABS. EOSINOPHILS 0.0 0.0 - 0.4 K/UL  
 ABS. BASOPHILS 0.0 0.0 - 0.06 K/UL  
 DF MANUAL PLATELET COMMENTS ADEQUATE PLATELETS    
 RBC COMMENTS ANISOCYTOSIS 1+ 
    
 RBC COMMENTS POLYCHROMASIA 1+ 
    
 RBC COMMENTS HYPOCHROMIA 2+ 
    
 RBC COMMENTS POIKILOCYTOSIS 
1+ MAGNESIUM Collection Time: 12/10/19  4:30 AM  
Result Value Ref Range Magnesium 2.9 (H) 1.6 - 2.6 mg/dL PHOSPHORUS Collection Time: 12/10/19  4:30 AM  
Result Value Ref Range Phosphorus 3.9 2.5 - 4.9 MG/DL  
CARDIAC PANEL,(CK, CKMB & TROPONIN) Collection Time: 12/10/19  4:30 AM  
Result Value Ref Range  (H) 26 - 192 U/L  
 CK - MB 16.8 (H) <3.6 ng/ml CK-MB Index 7.8 (H) 0.0 - 4.0 % Troponin-I, QT 7.24 (HH) 0.0 - 0.045 NG/ML  
GLUCOSE, POC Collection Time: 12/10/19  6:02 AM  
Result Value Ref Range Glucose (POC) 128 (H) 70 - 110 mg/dL TYPE & SCREEN Collection Time: 12/10/19  6:45 AM  
Result Value Ref Range Crossmatch Expiration 12/13/2019 ABO/Rh(D) A POSITIVE Antibody screen NEG   
EKG, 12 LEAD, SUBSEQUENT Collection Time: 12/10/19  7:45 AM  
Result Value Ref Range Ventricular Rate 125 BPM  
 Atrial Rate 94 BPM  
 QRS Duration 70 ms Q-T Interval 254 ms QTC Calculation (Bezet) 366 ms Calculated R Axis -36 degrees Calculated T Axis 13 degrees Diagnosis Accelerated Junctional rhythm Left axis deviation Low voltage QRS Lateral infarct (cited on or before 07-DEC-2019) Inferior infarct (cited on or before 08-DEC-2019) ** ** ACUTE MI / STEMI ** ** Abnormal ECG When compared with ECG of 09-DEC-2019 14:09, 
Junctional rhythm has replaced Sinus rhythm Questionable change in QRS duration Questionable change in initial forces of Lateral leads HEPATIC FUNCTION PANEL Collection Time: 12/10/19  8:03 AM  
Result Value Ref Range Protein, total 6.3 (L) 6.4 - 8.2 g/dL Albumin 2.4 (L) 3.4 - 5.0 g/dL Globulin 3.9 2.0 - 4.0 g/dL A-G Ratio 0.6 (L) 0.8 - 1.7 Bilirubin, total 0.9 0.2 - 1.0 MG/DL Bilirubin, direct 0.7 (H) 0.0 - 0.2 MG/DL Alk. phosphatase 182 (H) 45 - 117 U/L  
 AST (SGOT) 735 (H) 10 - 38 U/L  
 ALT (SGPT) 454 (H) 13 - 56 U/L FIBRINOGEN Collection Time: 12/10/19  8:03 AM  
Result Value Ref Range Fibrinogen 188 (L) 210 - 451 mg/dL PTT Collection Time: 12/10/19  8:03 AM  
Result Value Ref Range aPTT 40.4 (H) 23.0 - 36.4 SEC  
LD Collection Time: 12/10/19  8:03 AM  
Result Value Ref Range  LD 1,209 (H) 81 - 234 U/L

## 2019-12-10 NOTE — ROUTINE PROCESS
Bedside and Verbal shift change report given to Yarelis Humphreys RN and Ankit Decker RN (oncoming nurse) by Sherlyn Esparza RN (offgoing nurse). Report included the following information SBAR, Kardex, Procedure Summary, Intake/Output, MAR, Recent Results, Cardiac Rhythm Sinus Tach, 1st degree AV block, Alarm Parameters  and Quality Measures.

## 2019-12-10 NOTE — PROGRESS NOTES
Responded to code blue. Patient was found to be in PEA arrest and CPR was ongoing and being led by Dr. Margarita Johns. We were able to achieve ROSC after around 5 cycles of CPR. I called the patient's son, Mr Gee Red, and told him about the grave prognosis and what his wishes are regarding his mother. I told him about the CPR and that any chance of good functional/neurological recovery is poor now with multi-organ failure. I gave him the options of aggressive interventions vs making her DNR if she codes again. He chose to make her DNR and didn't want aggressive interventions. I spoke to the son again once the patient was back in the ICU and he decided to make the patient comfort care with compassionate extubation. He realized the severity and grave prognosis of the patient and wanted her to go in peace, as were her wishes. Patient made comfort care, effective 9:30 am on 12/10/2019. Family is at bedside. Case was discussed with nursing staff and attending. Deborah Deluca MD 
Pulmonary & Critical Care Fellow [facility]

## 2019-12-10 NOTE — ROUTINE PROCESS
0700:  Assumed care of pt at this time with MARVIN wooten Les May. Care and charting will be performed in tandem.

## 2019-12-10 NOTE — PROGRESS NOTES
responded to Death of  Deb Prasad, who was a 62 y.o.,female, The  provided the following Interventions: 
Provided crisis pastoral care, pastoral support and grief interventions. Offered prayers on behalf of the patient. Chart reviewed. Family still trying to decide on  arrangements. Plan: 
Chaplains will continue to follow and will provide pastoral care on an as needed/requested basis and grief support for the family. Prema Washington Board Certified [address] [place] [address] Care  
[phone]

## 2019-12-10 NOTE — PROGRESS NOTES
Rapid Response Note - Code Blue called at 3509 - CPR in progress - Code run by ACLS protocol - Pt actively being bagged - PEA at 0903 
 
- CBC, CMP, Cardiac Panel ordered - Son, Soni Doe, called at [phone] 
- 2 epi 
- 2 bicarb - 2 g mg 
- ROSC @ 0906  
 
- PFM dismissed by ICU attending, signed off at [phone] 
- See ICU documentation for further management Damaso Germain, [address] [place] [facility]

## 2019-12-10 NOTE — ROUTINE PROCESS
0700- Received report from night shift RN, plan of care reviewed. 0730- Patient noted to have negative gag and corneal reflexes, head CT ordered. 4671- Patient transported by Colette Franklin (RNs) and Chanel Mejias, RT. 
0900- After transfer from stretcher to CT table, patient found to be pulseless by 2 RNs, compressions initiated, code blue called overhead. ICU staff and Intensivist arrived within 2 minutes of overhead call. 4559- Pulse regained after multiple cycles of compressions and administration of emergency medications, verbal DNR consent received from patient family member Fina To. Decision to transport back to ICU.  
0930- Return to ICU, comfort orders placed, PRN medications given prior to extubation. Fina To asks patient be extubated prior to his arrival in patient room. 0248- Patient extubated, tachypneic agonal respirations noted. 7155- PMS pronounced patient  at 0955. [employer] notified, Juanito Mederos spoke to [employer]. Requested to hold for [facility]. [address] [facility] bank clears the body to be released. Patient will be transported to [facility] for burial arrangements.

## 2019-12-10 NOTE — PROGRESS NOTES
Problem: Diabetes Self-Management Goal: *Disease process and treatment process Description Define diabetes and identify own type of diabetes; list 3 options for treating diabetes. Outcome: Progressing Towards Goal 
Goal: *Incorporating nutritional management into lifestyle Description Describe effect of type, amount and timing of food on blood glucose; list 3 methods for planning meals. Outcome: Progressing Towards Goal 
Goal: *Incorporating physical activity into lifestyle Description State effect of exercise on blood glucose levels. Outcome: Progressing Towards Goal 
Goal: *Developing strategies to promote health/change behavior Description Define the ABC's of diabetes; identify appropriate screenings, schedule and personal plan for screenings. Outcome: Progressing Towards Goal 
Goal: *Using medications safely Description State effect of diabetes medications on diabetes; name diabetes medication taking, action and side effects. Outcome: Progressing Towards Goal 
Goal: *Monitoring blood glucose, interpreting and using results Description Identify recommended blood glucose targets  and personal targets. Outcome: Progressing Towards Goal 
Goal: *Prevention, detection, treatment of acute complications Description List symptoms of hyper- and hypoglycemia; describe how to treat low blood sugar and actions for lowering  high blood glucose level. Outcome: Progressing Towards Goal 
Goal: *Prevention, detection and treatment of chronic complications Description Define the natural course of diabetes and describe the relationship of blood glucose levels to long term complications of diabetes. Outcome: Progressing Towards Goal 
Goal: *Developing strategies to address psychosocial issues Description Describe feelings about living with diabetes; identify support needed and support network Outcome: Progressing Towards Goal 
Goal: *Insulin pump training Outcome: Progressing Towards Goal 
 Goal: *Sick day guidelines Outcome: Progressing Towards Goal 
Goal: *Patient Specific Goal (EDIT GOAL, INSERT TEXT) Outcome: Progressing Towards Goal 
  
Problem: Patient Education: Go to Patient Education Activity Goal: Patient/Family Education Outcome: Progressing Towards Goal 
  
Problem: Patient Education: Go to Patient Education Activity Goal: Patient/Family Education Outcome: Progressing Towards Goal 
  
Problem: Sepsis: Day of Diagnosis Goal: Off Pathway (Use only if patient is Off Pathway) Outcome: Progressing Towards Goal 
Goal: *Fluid resuscitation Outcome: Progressing Towards Goal 
Goal: *Paired blood cultures prior to first dose of antibiotic Outcome: Progressing Towards Goal 
Goal: *First dose of  appropriate antibiotic within 3 hours of arrival to ED, within 1 hour of arrival to ICU Outcome: Progressing Towards Goal 
Goal: *Lactic acid with first set of blood cultures Outcome: Progressing Towards Goal 
Goal: *Pneumococcal immunization (if eligible) Outcome: Progressing Towards Goal 
Goal: *Influenza immunization (if eligible) Outcome: Progressing Towards Goal 
Goal: Activity/Safety Outcome: Progressing Towards Goal 
Goal: Consults, if ordered Outcome: Progressing Towards Goal 
Goal: Diagnostic Test/Procedures Outcome: Progressing Towards Goal 
Goal: Nutrition/Diet Outcome: Progressing Towards Goal 
Goal: Discharge Planning Outcome: Progressing Towards Goal 
Goal: Medications Outcome: Progressing Towards Goal 
Goal: Respiratory Outcome: Progressing Towards Goal 
Goal: Treatments/Interventions/Procedures Outcome: Progressing Towards Goal 
Goal: Psychosocial 
Outcome: Progressing Towards Goal 
  
Problem: Sepsis: Day 2 Goal: Off Pathway (Use only if patient is Off Pathway) Outcome: Progressing Towards Goal 
Goal: *Central Venous Pressure maintained at 8-12 mm Hg Outcome: Progressing Towards Goal 
Goal: *Hemodynamically stable Outcome: Progressing Towards Goal 
 Goal: *Tolerating diet Outcome: Progressing Towards Goal 
Goal: Activity/Safety Outcome: Progressing Towards Goal 
Goal: Consults, if ordered Outcome: Progressing Towards Goal 
Goal: Diagnostic Test/Procedures Outcome: Progressing Towards Goal 
Goal: Nutrition/Diet Outcome: Progressing Towards Goal 
Goal: Discharge Planning Outcome: Progressing Towards Goal 
Goal: Medications Outcome: Progressing Towards Goal 
Goal: Respiratory Outcome: Progressing Towards Goal 
Goal: Treatments/Interventions/Procedures Outcome: Progressing Towards Goal 
Goal: Psychosocial 
Outcome: Progressing Towards Goal 
  
Problem: Sepsis: Day 3 Goal: Off Pathway (Use only if patient is Off Pathway) Outcome: Progressing Towards Goal 
Goal: *Central Venous Pressure maintained at 8-12 mm Hg Outcome: Progressing Towards Goal 
Goal: *Oxygen saturation within defined limits Outcome: Progressing Towards Goal 
Goal: *Vital sign stability Outcome: Progressing Towards Goal 
Goal: *Tolerating diet Outcome: Progressing Towards Goal 
Goal: *Demonstrates progressive activity Outcome: Progressing Towards Goal 
Goal: Activity/Safety Outcome: Progressing Towards Goal 
Goal: Consults, if ordered Outcome: Progressing Towards Goal 
Goal: Diagnostic Test/Procedures Outcome: Progressing Towards Goal 
Goal: Nutrition/Diet Outcome: Progressing Towards Goal 
Goal: Discharge Planning Outcome: Progressing Towards Goal 
Goal: Medications Outcome: Progressing Towards Goal 
Goal: Respiratory Outcome: Progressing Towards Goal 
Goal: Treatments/Interventions/Procedures Outcome: Progressing Towards Goal 
Goal: Psychosocial 
Outcome: Progressing Towards Goal 
  
Problem: Sepsis: Day 4 Goal: Off Pathway (Use only if patient is Off Pathway) Outcome: Progressing Towards Goal 
Goal: Activity/Safety Outcome: Progressing Towards Goal 
Goal: Consults, if ordered Outcome: Progressing Towards Goal 
Goal: Diagnostic Test/Procedures Outcome: Progressing Towards Goal 
Goal: Nutrition/Diet Outcome: Progressing Towards Goal 
Goal: Discharge Planning Outcome: Progressing Towards Goal 
Goal: Medications Outcome: Progressing Towards Goal 
Goal: Respiratory Outcome: Progressing Towards Goal 
Goal: Treatments/Interventions/Procedures Outcome: Progressing Towards Goal 
Goal: Psychosocial 
Outcome: Progressing Towards Goal 
Goal: *Oxygen saturation within defined limits Outcome: Progressing Towards Goal 
Goal: *Hemodynamically stable Outcome: Progressing Towards Goal 
Goal: *Vital signs within defined limits Outcome: Progressing Towards Goal 
Goal: *Tolerating diet Outcome: Progressing Towards Goal 
Goal: *Demonstrates progressive activity Outcome: Progressing Towards Goal 
Goal: *Fluid volume maintenance Outcome: Progressing Towards Goal 
  
Problem: Sepsis: Day 5 Goal: Off Pathway (Use only if patient is Off Pathway) Outcome: Progressing Towards Goal 
Goal: *Oxygen saturation within defined limits Outcome: Progressing Towards Goal 
Goal: *Vital signs within defined limits Outcome: Progressing Towards Goal 
Goal: *Tolerating diet Outcome: Progressing Towards Goal 
Goal: *Demonstrates progressive activity Outcome: Progressing Towards Goal 
Goal: *Discharge plan identified Outcome: Progressing Towards Goal 
Goal: Activity/Safety Outcome: Progressing Towards Goal 
Goal: Consults, if ordered Outcome: Progressing Towards Goal 
Goal: Diagnostic Test/Procedures Outcome: Progressing Towards Goal 
Goal: Nutrition/Diet Outcome: Progressing Towards Goal 
Goal: Discharge Planning Outcome: Progressing Towards Goal 
Goal: Medications Outcome: Progressing Towards Goal 
Goal: Respiratory Outcome: Progressing Towards Goal 
Goal: Treatments/Interventions/Procedures Outcome: Progressing Towards Goal 
Goal: Psychosocial 
Outcome: Progressing Towards Goal 
  
Problem: Sepsis: Day 6 Goal: Off Pathway (Use only if patient is Off Pathway) Outcome: Progressing Towards Goal 
Goal: *Oxygen saturation within defined limits Outcome: Progressing Towards Goal 
Goal: *Vital signs within defined limits Outcome: Progressing Towards Goal 
Goal: *Tolerating diet Outcome: Progressing Towards Goal 
Goal: *Demonstrates progressive activity Outcome: Progressing Towards Goal 
Goal: Influenza immunization Outcome: Progressing Towards Goal 
Goal: *Pneumococcal immunization Outcome: Progressing Towards Goal 
Goal: Activity/Safety Outcome: Progressing Towards Goal 
Goal: Diagnostic Test/Procedures Outcome: Progressing Towards Goal 
Goal: Nutrition/Diet Outcome: Progressing Towards Goal 
Goal: Discharge Planning Outcome: Progressing Towards Goal 
Goal: Medications Outcome: Progressing Towards Goal 
Goal: Respiratory Outcome: Progressing Towards Goal 
Goal: Treatments/Interventions/Procedures Outcome: Progressing Towards Goal 
Goal: Psychosocial 
Outcome: Progressing Towards Goal 
  
Problem: Sepsis: Discharge Outcomes Goal: *Vital signs within defined limits Outcome: Progressing Towards Goal 
Goal: *Tolerating diet Outcome: Progressing Towards Goal 
Goal: *Verbalizes understanding and describes prescribed diet Outcome: Progressing Towards Goal 
Goal: *Demonstrates progressive activity Outcome: Progressing Towards Goal 
Goal: *Describes follow-up/return visits to physicians Outcome: Progressing Towards Goal 
Goal: *Verbalizes name, dosage, time, side effects, and number of days to continue medications Outcome: Progressing Towards Goal 
Goal: *Influenza immunization (Oct-Mar only) Outcome: Progressing Towards Goal 
Goal: *Pneumococcal immunization Outcome: Progressing Towards Goal 
Goal: *Lungs clear or at baseline Outcome: Progressing Towards Goal 
Goal: *Oxygen saturation returns to baseline or 90% or better on room air Outcome: Progressing Towards Goal 
Goal: *Glycemic control Outcome: Progressing Towards Goal 
 Goal: *Absence of deep venous thrombosis signs and symptoms(Stroke Metric) Outcome: Progressing Towards Goal 
Goal: *Describes available resources and support systems Outcome: Progressing Towards Goal 
Goal: *Optimal pain control at patient's stated goal 
Outcome: Progressing Towards Goal 
  
Problem: Altered Thought Process (Adult/Pediatric) Goal: *STG: Participates in treatment plan Outcome: Progressing Towards Goal 
Goal: *STG: Remains safe in hospital 
Outcome: Progressing Towards Goal 
Goal: *STG: Seeks staff when feelings of anxiety and fear arise Outcome: Progressing Towards Goal 
Goal: *STG: Complies with medication therapy Outcome: Progressing Towards Goal 
Goal: *STG: Attends activities and groups Outcome: Progressing Towards Goal 
Goal: *STG: Decreased delusional thinking Outcome: Progressing Towards Goal 
Goal: *STG: Decreased hallucinations Outcome: Progressing Towards Goal 
Goal: *STG: Absence of lethality Outcome: Progressing Towards Goal 
Goal: *STG: Demonstrates ability to understand and use improved judgment in daily activities and relationships Outcome: Progressing Towards Goal 
Goal: *LTG: Returns to baseline functioning Outcome: Progressing Towards Goal 
Goal: Interventions Outcome: Progressing Towards Goal 
  
Problem: Patient Education: Go to Patient Education Activity Goal: Patient/Family Education Outcome: Progressing Towards Goal 
  
Problem: Pain Goal: *Control of Pain Outcome: Progressing Towards Goal 
Goal: *PALLIATIVE CARE:  Alleviation of Pain Outcome: Progressing Towards Goal 
  
Problem: Patient Education: Go to Patient Education Activity Goal: Patient/Family Education Outcome: Progressing Towards Goal 
  
Problem: Falls - Risk of 
Goal: *Absence of Falls Description Document Jose Montano Fall Risk and appropriate interventions in the flowsheet. Outcome: Progressing Towards Goal 
Note: Fall Risk Interventions: 
Mobility Interventions: Bed/chair exit alarm Mentation Interventions: Adequate sleep, hydration, pain control, Bed/chair exit alarm, Door open when patient unattended, Evaluate medications/consider consulting pharmacy, More frequent rounding, Reorient patient, Toileting rounds, Update white board Medication Interventions: Bed/chair exit alarm Elimination Interventions: Bed/chair exit alarm, Toileting schedule/hourly rounds Problem: Patient Education: Go to Patient Education Activity Goal: Patient/Family Education Outcome: Progressing Towards Goal 
  
Problem: Injury - Risk of, Adverse Drug Event Goal: *Absence of adverse drug events Outcome: Progressing Towards Goal 
Goal: *Absence of medication errors Outcome: Progressing Towards Goal 
Goal: *Knowledge of prescribed medications Outcome: Progressing Towards Goal 
  
Problem: Patient Education: Go to Patient Education Activity Goal: Patient/Family Education Outcome: Progressing Towards Goal 
  
Problem: Pressure Injury - Risk of 
Goal: *Prevention of pressure injury Description Document Jeff Scale and appropriate interventions in the flowsheet. Outcome: Progressing Towards Goal 
Note: Pressure Injury Interventions: 
Sensory Interventions: Assess changes in LOC, Assess need for specialty bed, Check visual cues for pain, Keep linens dry and wrinkle-free, Maintain/enhance activity level, Minimize linen layers, Monitor skin under medical devices, Pressure redistribution bed/mattress (bed type), Turn and reposition approx. every two hours (pillows and wedges if needed) Moisture Interventions: Absorbent underpads, Assess need for specialty bed, Check for incontinence Q2 hours and as needed, Internal/External urinary devices, Minimize layers Activity Interventions: Assess need for specialty bed, Pressure redistribution bed/mattress(bed type) Mobility Interventions: Assess need for specialty bed, HOB 30 degrees or less, Pressure redistribution bed/mattress (bed type), Turn and reposition approx. every two hours(pillow and wedges) Nutrition Interventions: Document food/fluid/supplement intake Friction and Shear Interventions: Foam dressings/transparent film/skin sealants, HOB 30 degrees or less, Lift sheet, Lift team/patient mobility team, Minimize layers, Transferring/repositioning devices Problem: Patient Education: Go to Patient Education Activity Goal: Patient/Family Education Outcome: Progressing Towards Goal 
  
Problem: Ventilator Management Goal: *Adequate oxygenation and ventilation Outcome: Progressing Towards Goal 
Goal: *Patient maintains clear airway/free of aspiration Outcome: Progressing Towards Goal 
Goal: *Absence of infection signs and symptoms Outcome: Progressing Towards Goal 
Goal: *Normal spontaneous ventilation Outcome: Progressing Towards Goal 
  
Problem: Patient Education: Go to Patient Education Activity Goal: Patient/Family Education Outcome: Progressing Towards Goal 
  
Problem: Non-Violent Restraints Goal: *Removal from restraints as soon as assessed to be safe Outcome: Progressing Towards Goal 
Goal: *No harm/injury to patient while restraints in use Outcome: Progressing Towards Goal 
Goal: *Patient's dignity will be maintained Outcome: Progressing Towards Goal 
Goal: *Patient Specific Goal (EDIT GOAL, INSERT TEXT) Outcome: Progressing Towards Goal 
Goal: Non-violent Restaints:Standard Interventions Outcome: Progressing Towards Goal 
Goal: Non-violent Restraints:Patient Interventions Outcome: Progressing Towards Goal 
Goal: Patient/Family Education Outcome: Progressing Towards Goal

## 2019-12-10 NOTE — PROGRESS NOTES
[facility]. Pulmonary, Critical Care, and Sleep Medicine Name: Melvina Dixon MRN: [id number] : 1960 Hospital: [address] [place] [address] Dr Date: 12/10/2019  Admission Date: 2019 Chart and notes reviewed. Data reviewed. I have evaluated all findings. [x]I have reviewed the flowsheet and previous days notes. [x]The patient is unable to give any meaningful history or review of systems because the patient is: 
[x]Intubated []Sedated [x]Unresponsive [x]The patient is critically ill on     
[x]Mechanical ventilation [x]Pressors []BiPAP []  
 
 
12/10/19 Mentation: Unresponsive Respiratory/ Secretions: Not a whole lot Hemodynamics: On 10 , down-titrating Urine output: Has declined. 150cc since midnight Need for procedures: Yes, needs a left thora/chest tube but INR is supratherapeutic ROS:Review of systems not obtained due to patient factors. Events and notes from last 24 hours reviewed. Care plan discussed on multidisciplinary rounds. Patient Active Problem List  
Diagnosis Code  Altered mental status R41.82  
 Adenocarcinoma of right lung ([facility]) C34.91  
 CHF (congestive heart failure) ([facility]) I50.9  Controlled type 2 diabetes mellitus without complication, without long-term current use of insulin ([facility]) E11.9  Multiple subsegmental pulmonary emboli without acute cor pulmonale I26.94  
 Obesity, morbid, BMI 40.0-49.9 ([facility]) E66.01  
 PAF (paroxysmal atrial fibrillation) ([facility]) I48.0  Pericardial effusion I31.3  Pleural effusion J90  
 Sepsis ([facility]) A41.9  Acute kidney injury ([facility] N17.9  Elevated troponin R79.89 Vital Signs: 
Visit Vitals BP (!) 152/106 Pulse (!) 112 Temp 98.9 °F (37.2 °C) Resp (!) 37 Ht 5' 6\" (1.676 m) Wt 88.9 kg (195 lb 15.8 oz) SpO2 100% Breastfeeding No  
BMI 31.63 kg/m² O2 Device: Ventilator, Endotracheal tube O2 Flow Rate (L/min): 40 l/min Temp (24hrs), Av.7 °F (37.1 °C), Min:98.3 °F (36.8 °C), Max:98.9 °F (37.2 °C) Intake/Output:  
Last shift:      No intake/output data recorded. Last 3 shifts:  1901 - 12/10 0700 In: 3272.5 [I.V.:3272.5] Out: 1841 [FRXTM:1811] Intake/Output Summary (Last 24 hours) at 12/10/2019 4744 Last data filed at 12/10/2019 0700 Gross per 24 hour Intake 2556.7 ml Output 850 ml Net 1706.7 ml  
  
 
Ventilator Settings: 
Ventilator Mode: Assist control, VC+ Respiratory Rate Back-Up Rate: 20 Insp Time (sec): 0.9 sec Ventilator Volumes Vt Set (ml): 400 ml Vt Exhaled (Machine Breath) (ml): 466 ml Vt Spont (ml): 413 ml Ve Observed (l/min): 17.6 l/min Ventilator Pressures PIP Observed (cm H2O): 35 cm H2O Plateau Pressure (cm H2O): 32 cm H2O 
MAP (cm H2O): 19 PEEP/VENT (cm H2O): 5 cm H20 Auto PEEP Observed (cm H2O): 0 cm H2O Current Facility-Administered Medications Medication Dose Route Frequency  hydrocortisone Sod Succ (PF) (SOLU-CORTEF) injection 50 mg  50 mg IntraVENous Q6H  
 sodium chloride 3% hypertonic nebulizer soln  4 mL Nebulization Q4H RT  
 calcium gluconate 1 g in 0.9% sodium chloride 100 mL IVPB  1 g IntraVENous ONCE  
 vasopressin (VASOSTRICT) 20 Units in 0.9% sodium chloride 100 mL infusion  0-0.04 Units/min IntraVENous TITRATE  influenza vaccine - (6 mos+)(PF) (FLUARIX/FLULAVAL/FLUZONE QUAD) injection 0.5 mL  0.5 mL IntraMUSCular PRIOR TO DISCHARGE  dexmedeTOMidine (PRECEDEX) 400 mcg in 0.9% sodium chloride 100 mL infusion  0.2-1.4 mcg/kg/hr IntraVENous TITRATE  fentaNYL (PF) 900 mcg/30 ml infusion soln  0-200 mcg/hr IntraVENous TITRATE  chlorhexidine (PERIDEX) 0.12 % mouthwash 10 mL  10 mL Oral Q12H  
 NOREPINephrine (LEVOPHED) 8 mg in 5% dextrose 250mL (32 mcg/mL) infusion  0.5-30 mcg/min IntraVENous TITRATE  famotidine (PF) (PEPCID) 20 mg in 0.9% sodium chloride 10 mL injection  20 mg IntraVENous Q12H  amiodarone (NEXTERONE) 360 mg in dextrose 200 mL (1.8 mg/mL) infusion  0.5-1 mg/min IntraVENous TITRATE  
 0.9% sodium chloride infusion  25 mL/hr IntraVENous CONTINUOUS  
 insulin lispro (HUMALOG) injection   SubCUTAneous Q6H  
 piperacillin-tazobactam (ZOSYN) 3.375 g in 0.9% sodium chloride (MBP/ADV) 100 mL MBP  3.375 g IntraVENous Q6H Telemetry:  
 
Physical Exam:  
 General: severely ill HEENT: Pupils are not reactive, pinpoint Neck: No abnormally enlarged lymph nodes. Chest: normal 
 Lungs: decreased air exchange LLL, Heart: without murmur or extra heart sounds Abdomen: non distended, bowel sounds normoactive, tympanic, abdomen is soft without significant tenderness, masses, organomegaly or guarding Extremity: 2+ edema Neuro: comatose Skin: cold extremities DATA: 
MAR reviewed and pertinent medications noted or modified as needed Labs: 
Recent Labs 12/10/19 
0430 12/09/19 
0308 12/08/19 
1900 WBC 19.6* 20.5* 22.3* HGB 10.0* 10.9* 11.3* HCT 32.3* 33.5* 34.5*  
* 237 280 Recent Labs 12/10/19 
0430 12/09/19 
0927 12/09/19 
0308 12/08/19 
1900  12/07/19 
1241   --  140 141   < > 132* K 4.6  --  4.4 4.3   < > 4.7 *  --  111 111   < > 99* CO2 17*  --  19* 19*   < > 21  
*  --  189* 184*   < > 265* BUN 49*  --  32* 32*   < > 34* CREA 2.05*  --  1.17 1.21   < > 1.80* CA 7.8*  --  8.2* 8.1*   < > 8.7 MG 2.9*  --  2.6  --   --  2.5 PHOS 3.9  --   --   --   --   --   
ALB  --   --   --   --   --  2.3*  
SGOT  --   --   --   --   --  32 ALT  --   --   --   --   --  34 INR >9.2* 4.4* 4.4* 4.3*   < >  --   
 < > = values in this interval not displayed. No results for input(s): PH, PCO2, PO2, HCO3, FIO2 in the last 72 hours. Recent Labs 12/10/19 
0348 12/09/19 2054 12/09/19 
1733 FIO2I 50 60 60 HCO3I 14.2* 16.7* 18.1*  
PCO2I 24.4* 31.5* 44.3 PHI 7.374 7.333* 7.219* PO2I 74* 125* 86 Imaging: [x]   I have personally reviewed the patients radiographs and reports XR Results (most recent): 
Results from Hospital Encounter encounter on 12/07/19 XR CHEST PORT Narrative EXAM: CHEST  CPT CODE: 65498 CLINICAL INDICATION/HISTORY: ET tube placement. COMPARISON: Chest radiograph of earlier this same date. TECHNIQUE: Single AP portable view of chest at about 1532. FINDINGS: There has been interval replacement of an endotracheal tube with the 
tip about 3 cm above the dany and a nasogastric tube with the tip below the 
diaphragm and off the bottom of the image. The right internal jugular central 
venous infusion port catheter tip in the superior vena cava is unchanged. Right 
lung is grossly clear. There is complete opacification left chest and some 
right shift of mediastinal structures into the right chest.  The heart and 
mediastinum is mostly obscured. The bones and soft tissues are unremarkable. Impression IMPRESSION: 
 
Intubated; tip about 3 cm above the dany. Nasogastric tube tip below the 
diaphragm and off the bottom of the image. Right lung clear; persistent complete opacification left chest with some right 
shift of midline structures into the right chest. 
 
  
 
 
CT Results (most recent): 
Results from Hospital Encounter encounter on 12/07/19 CT CHEST ABD PELV WO CONT Narrative EXAM: CT CHEST ABD PELV WO CONT 
 
HISTORY: Evaluation of L lung ; recent discharge from other hospital; altered 
mental status, concern for sepsis; elevated lactic acid TECHNIQUE: Helical 5 mm axial images acquired through chest, abdomen, and 
pelvis. Images are reviewed in soft tissue, lung, and bone windows, with the 
use of multiplanar reformations as well. CONTRAST USED:  None CT scans at this facility are performed using dose optimization technique as 
appropriate with performed exam, to include automated exposure control, 
 adjustment of mA and/or kV according to patient's size (including appropriate 
matching for site-specific examinations), or use of iterative reconstruction 
technique. COMPARISON: recent outside CT scans of chest as well as abdomen/pelvis at [facility] FINDINGS: 
 
CT OF THE CHEST:  
Mediastinum:  Decreased size of pericardial effusion, now small volume. Overall 
normal heart size. This noncontrast evaluation will not identified the 
previously seen pulmonary emboli. Normal caliber aorta. No overt mediastinal adenopathy. However, there are mildly enlarged lymph nodes 
of the left supraclavicular and axillary region as before. Lungs:  Sizable loculated left-sided pleural effusion of relatively low-density. No gas content or air-fluid level. Accompanying significant 
compression/consolidation of left lung parenchyma. Some ill-defined airspace 
disease within residual aerated left upper lung. Small areas of nodular inflammation within the right upper lung as well. There 
is some apical bulla on the right. ET tube unremarkable. No central endobronchial lesion. Minimal right base pleural fluid and consolidation. Chest wall/ soft tissues:  Right chest wall Port-A-Cath present. No additional 
finding. CT OF THE ABDOMEN/PELVIS: 
 
Scant amount of perihepatic fluid. Liver: Redemonstration of multiple cystic hypodensities, including largest 
bilobed 7 cm focus of the right upper liver. Gallbladder/biliary: Gallstones within contracted gallbladder. No ductal 
dilatation. Spleen: Unremarkable Pancreas: Unremarkable. Left Kidney: No stones or hydronephrosis. Small vascular calcification more 
inferiorly. Right kidney: No stones or hydronephrosis. Adrenals: Unremarkable. Bowels/mesentery: NG tube into the distal stomach. There is no bowel 
obstruction. There are some large bowel diverticula. Mild haziness of the low anterior mesentery. Urinary bladder: Decompressed around Zhu catheter. Reproductive Organs: Prior hysterectomy. Vascular: Aorta unremarkable for age. Left femoral venous line noted. Lymph Nodes: No adenopathy. Bones: No acute findings. Unremarkable for age. Impression IMPRESSION: 
 
1. Compared with recent outside CT 
-There is now sizable loculated left-sided pleural effusion, presumably 
exudative; no destructive findings or gas content to substantiate empyema 
-Decreased size of pericardial effusion 
-Foci of inflammation within the lungs as well as compressive atelectasis; 
multifocal pneumonia is a differential consideration. No cavitation. 
-This examination does not sufficiently evaluate status of the recently 
identified pulmonary emboli 
-Mild nonspecific stranding within the low anterior mesentery 
-No bowel obstruction 
-Gallstones without obstruction or inflammation IMPRESSION:  
· 59F w/ metastatic NSCLC/adenocarcinoma with pericardial effusion s/p window, multiple PE, left sided malignant pleural effusion admitted for acute hypoxic respiratory failure and multi-organ failure. She has developed type 2 NSTEMI, Afib w RVR, FISH, lactic acidosis and coagulopathy over the last 24-48 hours and remains gravely ill. Her prognosis is extremely poor. Patient Active Problem List  
Diagnosis Code  Altered mental status R41.82  
 Adenocarcinoma of right lung ([facility]) C34.91  
 CHF (congestive heart failure) ([facility]) I50.9  Controlled type 2 diabetes mellitus without complication, without long-term current use of insulin ([facility]) E11.9  Multiple subsegmental pulmonary emboli without acute cor pulmonale I26.94  
 Obesity, morbid, BMI 40.0-49.9 ([facility]) E66.01  
 PAF (paroxysmal atrial fibrillation) ([facility]) I48.0  Pericardial effusion I31.3  Pleural effusion J90  
 Sepsis ([facility]) A41.9  Acute kidney injury ([facility] [address] N17.9  Elevated troponin R79.89 RECOMMENDATIONS:  
 · Neuro: Obtunded and comatose despite being not on any sedation. Could be having a new infarct or metastasis or headbleed. Would get a stat head CT. · Resp:  Acute hypoxic respiratory failure - multifactorial with multiple PE's, large left pleural effusion and perhaps element of PNA playing a role. Can't anticoagulate her at the moment given elevated PTT/INR and attempting a thoracentesis will be tough. BAL sample showed no growth and cell differential was unremarkable. Continue abx for PNA. Target O2 sat of 92% and above. · I/D: Concern for multifocal PNA and sepsis. Continue Zosyn. Add vancomycin and check a MRSA surveillance swab. Continue stress dose steroids. Check a procalcitonin. · Hem/Onc:  Leukocytosis likely 2/2 stress dose steroids. INR/PTT elevated which could be 2/2 DIC or malignancy related coagulopathy. Check LFT's, DIC panel. IV Vit K 10mg was already given. Discussed with hem/onc - prognosis appears very poor. Recommend palliative consult. · CVS: Combination of septic shock with type 2 NSTEMI. Troponin elevated to 7.24 and still rising. Also developed afib and now on Amiodarone. No role for anticoag for NSTEMI at the moment. Cardio following, appreciate recs. Already had a window done earlier for pericardial effusion. Keep on pressors and titrate to a MAP of 65. Already on hydrocortisone 50mg q6hr. · Metabolic:  Lactic acidosis 2/2 sepsis. · Renal: FISH probably due to ATN. Lytes stable at the moment. May consider bicarb gtt if bicarb continues to down-trend. Monitor · Endocrine: BG's slightly high. Will adjust insulin accordingly · GI: Check LFT's to rule out shock liver. · Musc/Skin: No issues at the moment · Code Status: Full code - called son Stefano Acrmarybel - [phone]) and I told him about her prognosis. Will discuss more face to face when he gets here. Best practice : 
 
Glycemic control - BG's slightly high. Will start insulin IHI ICU bundles: Central Line Bundle Followed , Qureshi Bundle Followed and Vent Bundle Followed, Vent Day 2 [facility] Vent patients- VAP bundle, aim to keep peak plateau pressure 41-66VO H2O Sress ulcer prophylaxis - continue given coagulopathy DVT prophylaxis - hold given elevated INR Need for Lines, qureshi assessed. Restraints need. Palliative care evaluation. High complexity decision making was performed during this consultation and evaluation. [x]       Pt is at high risk for further organ failure and dysfunction. Paulino Gonzalez MD 
[facility] 
PGY 4 Late entry for date of service 12/10/2019: I saw and evaluated the patient, performing the key elements of the service. I discussed the findings, assessment and plan with the fellow and agree with the fellow's findings and plan as documented in the fellow's note. Total of 41 min critical care time spent at bedside during the course of care providing evaluation,management and care decisions and ordering appropriate treatment related to critical care problems exclusive of procedures. The reason for providing this level of medical care for this critically ill patient was due a critical illness that impaired one or more vital organ systems such that there was a high probability of imminent or life threatening deterioration in the patients condition. This care involved high complexity decision making to assess, manipulate, and support vital system functions, to treat this degree vital organ system failure and to prevent further life threatening deterioration of the patients condition.  
 
 
78-year-old female transferred to ICU, history of metastatic adenocarcinoma, originally on right lung, found to have new malignant left pleural effusion recently diagnosed at [facility] [place].  Patient originally presented to the ER for altered mental status, recently discharged from [facility] [place] the day prior, sister at home reported the patient was not talking like her normal self, refusing to get off the floor, so EMS was activated. Upon presentation the ER, patient was restless and confused consistent with acute encephalopathy unable to answer questions. Patient also was tachypneic. Patient was admitted to stepdown, with concerns of severe sepsis with septic shock, suspected hospital-acquired pneumonia, as patient started on vancomycin and Zosyn. Patient also found to have hyperammonemia, of unknown origin, no documented liver metastases or hepatic dysfunction prior. Patient also has FISH, as well as large left pleural effusion, as well as history of pulmonary embolism along with left leg DVT on oral anticoagulation from recent hospitalization. With regards to adenocarcinoma, patient was to start palliative chemotherapy by Dr. Michelle Curtis, oncology consulted. Patient seen by pulmonary, developing worsening hypoxic respiratory failure, on continuous BiPAP, then developed worsening encephalopathy, so BiPAP discontinued. Patient was switched to high flow nasal cannula, and then emergently intubated yesterday for acute hypoxic respiratory failure. Post intubation, patient had mixed respiratory and metabolic acidosis. Overnight, patient remained hypotensive requiring vasopressors, as well as developed worsening mental status and tachypnea. Overnight, patient was responsive, this morning, patient developed pinpoint pupils and became unresponsive. Emergent CT head without contrast was performed to look for intracranial bleed since INR worsened from 4.8 yesterday to greater than 9.2 this morning. I suspect this was due to DIC, fibrinogen was 188. Upon transport to CT scanner, patient developed PEA cardiac arrest, ACLS was performed, CT scan unable to be performed. Patient was maxed out on Levophed and epinephrine and transferred back to the unit.   Discussion was had with the patient's M POA, patient's son who decided to make the patient comfort measures at that time. Patient was compassionately extubated and passed away quickly. Of note, patient also had upgoing troponin, and EKG earlier this morning also showed ST elevations, I suspect the patient had mixed cardiogenic and septic shock, as well as developed intracranial hemorrhage due to DIC. Karan Lofton MD/MPH Pulmonary, Critical Care Medicine [facility]

## 2019-12-10 NOTE — PROGRESS NOTES
[facility] update/interval note: 
 
 
55-year-old female transferred to ICU, history of metastatic adenocarcinoma, originally on right lung, found to have new malignant left pleural effusion [facility] diagnosed [facility] [place].  Patient originally presented to the ER for altered mental status yesterday evening, recently discharged from [facility] [place] the day prior, sister at home reported the patient was not talking like her normal self, refusing to get off the floor, so EMS was activated. Upon presentation the ER, patient was restless and confused consistent with acute encephalopathy unable to answer questions. Patient also was tachypneic. Patient was admitted to stepdown, with concerns of severe sepsis with septic shock, suspected hospital-acquired pneumonia, as patient started on vancomycin and Zosyn. Patient also found to have hyperammonemia, of unknown origin, no documented liver metastases or hepatic dysfunction prior. Patient also has FISH, as well as large left pleural effusion, as well as history of pulmonary embolism along with left leg DVT on oral anticoagulation from recent hospitalization. With regards to adenocarcinoma, patient was 1 to start palliative chemotherapy by Dr. Sandy Ingram, oncology consulted. Patient seen by pulmonary yesterday and today, developing worsening hypoxic respiratory failure, on continuous BiPAP, then developed worsening encephalopathy, so BiPAP discontinued. When I assessed the patient this afternoon, patient was on high flow nasal cannula with Vapotherm and FiO2 of 60% and 40 L/min of flow. Patient's SPO2 was 94%, however patient was very tachypneic, with respiratory rate in the 40s. Per my discussion with pulmonary consultation-Dr. Leigh Hashimoto and hospitalist service Dr. Sintia Andrews, patient's son is making decisions, he would like to continue full aggressive measures until he can come later this evening.   Since patient was developing worsening acute hypoxic respiratory failure along with acute encephalopathy, I made the decision to emergently intubate the patient. Just prior to the intubation, patient developed acute hypotension along with ST elevations seen on telemetry. Patient was started on vasopressors for acute septic shock, and emergent line placement was done post intubation. Cardiology was consulted post intubation, Dr. Raina Ford recommended to allow for tachycardia which is secondary to stress response given septic shock and metastatic adenocarcinoma, only start amiodarone if heart rate is sustained greater than the 150s. Repeat troponin shows patient has developed type II NSTEMI, but we will hold off on anticoagulation since patient is supratherapeutic, patient came in on oral NOAC, INR today remains 4. Patient also underwent emergent bronchoscopy post intubation, not found to have any endobronchial lesions, no right upper lobe has known cancer, no invasion of airways on that side. Left lower lobe shows extrinsic compression, likely due to very large pleural effusion, which is causing retention of mucus in left lower lobe. BAL was performed in left lower lobe. We will start aggressive chest PT with hypertonic saline nebs and placing patient in right lateral decubitus position in order to assist with postural drainage due to high risk of mucous retention left lower lobe from intrinsic compression. Will attempt to obtain CT chest to look at left pleural effusion, however patient is currently unstable with septic shock and hypoxia and metabolic lactic acidosis, also potential cardiogenic shock given upgoing troponin and tachycardia. On bedside ultrasound, left pleural effusion looked complex, thus CT scan will be needed to determine if patient needs chest tube thoracostomy. Due to coagulopathy, will hold off on chest tube placement at this time. Prognosis is very poor/grim Late entry for date of service 12/9/2019 Total of 81 min critical care time spent at bedside during the course of care providing evaluation,management and care decisions and ordering appropriate treatment related to critical care problems exclusive of procedures. The reason for providing this level of medical care for this critically ill patient was due a critical illness that impaired one or more vital organ systems such that there was a high probability of imminent or life threatening deterioration in the patients condition. This care involved high complexity decision making to assess, manipulate, and support vital system functions, to treat this degree vital organ system failure and to prevent further life threatening deterioration of the patients condition. Isabella Michelle MD/MPH Pulmonary, Critical Care Medicine [facility]

## 2019-12-10 NOTE — PROGRESS NOTES
responded to a [employer] for Arben, who is a 62 y.o.,female. Patients Primary Language is: [place]. The reason the Patient came to the hospital is:  
Patient Active Problem List  
 Diagnosis Date Noted  Altered mental status 12/07/2019  Adenocarcinoma of right lung ([address] 12/07/2019  Obesity, morbid, BMI 40.0-49.9 ([address] 12/07/2019  Sepsis ([address] 12/07/2019  Acute kidney injury ([address] 12/07/2019  Elevated troponin 12/07/2019  CHF (congestive heart failure) ([address] 12/04/2019  Controlled type 2 diabetes mellitus without complication, without long-term current use of insulin ([address] 11/30/2019  Pleural effusion 11/19/2019  PAF (paroxysmal atrial fibrillation) ([address] 11/17/2019  Multiple subsegmental pulmonary emboli without acute cor pulmonale 11/11/2019  Pericardial effusion 11/11/2019 The  provided the following Interventions for son Steen Hatchet: 
Initiated a relationship of care and support. Provided Crisis Pastoral Care Offered prayer and assurance of continued prayers on patient's behalf. Chart reviewed. Plan: 
Sheila Jimenez will conduct immediate follow-up and bed-side services. Marlys Ibarra Spiritual Care Department [phone]

## 2019-12-10 NOTE — PROGRESS NOTES
Per d/w RN: will discontinue SLP orders as pt is inappropriate for PO. Please re-consult if needed. Thank you for this referral. 
 
Vandana Aldridge M.S. CCC-SLP/L Speech-Language Pathologist

## 2019-12-10 NOTE — PROGRESS NOTES
Death Pronouncement [address] [place] Patient lying in bed, mouth closed, eyes closed. Pupils fixed and equal bilaterally. No response to verbal or painful stimuli. No heart or lung sounds auscultated. No carotid or peripheral pulses. Death officially pronounced at 09:55, 12/10/2019 Candelario Schaffer D.O. 
[facility]

## 2019-12-11 LAB
BACTERIA SPEC CULT: NORMAL
BACTERIA SPEC CULT: NORMAL
GRAM STN SPEC: NORMAL
GRAM STN SPEC: NORMAL
HAPTOGLOB SERPL-MCNC: 212 MG/DL (ref 30–200)
INR PPP: 4.8 (ref 0.9–1.1)
MIXING STUDIES PPP-IMP: ABNORMAL
PROCALCITONIN SERPL-MCNC: 12.21 NG/ML
PROTHROMBIN TIME: 44.5 SEC (ref 9–11.1)
PT 1H P INC PPP: 13 SEC
PT IMM NP PPP: 13.2 SEC
PT MIXING STUDY,CMS3: ABNORMAL
SERVICE CMNT-IMP: NORMAL
SERVICE CMNT-IMP: NORMAL

## 2019-12-11 NOTE — PROGRESS NOTES
Code Blue Note: 
 
 
I present for and was the leader of this CODE BLUE event. CODE BLUE was called overhead during patient's CT scan of head. Initial rhythm: PEA Start time: 9:01 AM 
End time: 9:06 AM 
Outcome: ROSC, with BP 56/31 mmHg. Patient was started on additional vasopressors with epinephrine drip and increase in Levophed drip. ACLS protocol was followed during this CODE BLUE event, please see flow sheet for all medications given, epinephrine and bicarb and magnesium were all given during this CODE BLUE event. Author Gladys KIRK/MPH Pulmonary, Critical Care Medicine [facility]

## 2019-12-13 LAB
BACTERIA SPEC CULT: NORMAL
BACTERIA SPEC CULT: NORMAL
SERVICE CMNT-IMP: NORMAL
SERVICE CMNT-IMP: NORMAL

## 2019-12-24 NOTE — DISCHARGE SUMMARY
[address] [place] [address] Dr Farheen Johnson [facility], [address] DEATH SUMMARY Name: Jarrell Correa MRN: [id number] Age / Sex: 62 y.o. / female CSN: [id number] YOB: 1960 Length of Stay: 3 days Admit Date: 2019 Discharge Date: PRIMARY CARE PHYSICIAN: None DISCHARGE DIAGNOSES: 
 
- acute hypoxic resp failure - HCAP 
- Acute metabolic encephalopathy may be 2/2 sepsis - Sepsis with lactic acidosis, FISH and encephalopathy in the setting of HCAP 
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 
- recently Diagnosed Lung adenoca, sees Dr Clay Bowles ( [facility] ) 
- h/o PE and LLE DVT. INR is elevated - h/o recent pericardial effusion 
- DM2 
- Type 2 MI 2/2 demand ischemia CONSULTS CALLED: PCCM, Cardiology, Nephrology, Oncology PROCEDURES DONE: Intubation, Bronchoscopy, Central Line COURSE IN THE HOSPITAL: This is a 56-year-old female with known past medical history of lung cancer with malignant right pleural effusion who presented to the ED with a change in mental status. Patient was noted to be hypoxic and was requiring BiPAP. Patient had elevated troponins and was seen by cardiology. Patient was admitted. Patient also had acute kidney injury and nephrology was consulted. Given worsening hypoxia pulmonary was consulted and patient was subsequently transferred to ICU. Patient continued to deteriorate and was intubated. Oncology was also consulted. Palliative discussions were held with the family. Patient subsequently went into PEA arrest and was resuscitated. After discussions with family patient was made DNR and subsequently passed away. For full details please refer to chart. MEDICATIONS ON DISCHARGE: 
 
Discharge Medication List as of 12/10/2019  2:22 PM  
  
 
 
 
CONDITION ON DISCHARGE: Patient  DISPOSITION: Patient  FOLLOW-UP RECOMMENDATIONS:  
Follow-up Information None Dragon medical dictation software was used for portions of this report. Unintended errors may occur. Signed:  Hollis Fox MD 
 
  12/23/2019